# Patient Record
Sex: MALE | Race: BLACK OR AFRICAN AMERICAN | Employment: UNEMPLOYED | URBAN - METROPOLITAN AREA
[De-identification: names, ages, dates, MRNs, and addresses within clinical notes are randomized per-mention and may not be internally consistent; named-entity substitution may affect disease eponyms.]

---

## 2017-01-20 ENCOUNTER — TELEPHONE (OUTPATIENT)
Dept: PULMONOLOGY | Facility: CLINIC | Age: 57
End: 2017-01-20

## 2017-01-24 ENCOUNTER — DOCUMENTATION ONLY (OUTPATIENT)
Dept: FAMILY MEDICINE CLINIC | Age: 57
End: 2017-01-24

## 2017-01-24 NOTE — LETTER
1/24/2017 Dortha Goodpasture 
[address] [facility] [address] [id number] Dear Mr. Dortha Goodpasture, We had an appointment reserved for you 1/16/2017 and were concerned when you did not show or call within 24 hours to cancel the appointment. As indicated in a previous letter, we will no longer be able to schedule appointments for you in advance. If you need to make an appointment, please call the office and we will attempt to schedule, but cannot guarantee, a work-in appointment. In order to provide optimal care to you, we expect you to make it to your appointments. Regrettably, if you miss a work-in appointment, we may have to discharge you from the practice. Sincerely, The scheduling staff [address] [place] [id number] 
[phone]

## 2017-02-08 ENCOUNTER — OFFICE VISIT (OUTPATIENT)
Dept: PULMONOLOGY | Facility: CLINIC | Age: 57
End: 2017-02-08

## 2017-02-08 VITALS
DIASTOLIC BLOOD PRESSURE: 70 MMHG | TEMPERATURE: 98.6 F | WEIGHT: 152 LBS | HEIGHT: 67 IN | OXYGEN SATURATION: 98 % | BODY MASS INDEX: 23.86 KG/M2 | SYSTOLIC BLOOD PRESSURE: 110 MMHG | RESPIRATION RATE: 20 BRPM | HEART RATE: 66 BPM

## 2017-02-08 DIAGNOSIS — J43.2 CENTRILOBULAR EMPHYSEMA (HCC): ICD-10-CM

## 2017-02-08 DIAGNOSIS — J43.1 PANLOBULAR EMPHYSEMA (HCC): ICD-10-CM

## 2017-02-08 DIAGNOSIS — R06.09 DOE (DYSPNEA ON EXERTION): ICD-10-CM

## 2017-02-08 DIAGNOSIS — J41.0 SIMPLE CHRONIC BRONCHITIS (HCC): Primary | ICD-10-CM

## 2017-02-08 RX ORDER — BUPROPION HYDROCHLORIDE 300 MG/1
300 TABLET ORAL
COMMUNITY
End: 2018-07-20

## 2017-02-08 RX ORDER — QUETIAPINE 150 MG/1
TABLET, FILM COATED, EXTENDED RELEASE ORAL
COMMUNITY
End: 2018-07-20

## 2017-02-08 RX ORDER — HYDROXYZINE PAMOATE 25 MG/1
25 CAPSULE ORAL
COMMUNITY
End: 2018-07-20

## 2017-02-08 NOTE — MR AVS SNAPSHOT
Visit Information Date & Time Provider Department Dept. Phone Encounter #  
 2/8/2017  3:15 PM MD Mimi [facility] [id number] Follow-up Instructions Return in about 3 months (around 5/8/2017). Routing History Upcoming Health Maintenance Date Due FOBT Q 1 YEAR AGE 50-75 12/14/2017 DTaP/Tdap/Td series (2 - Td) 12/14/2026 Allergies as of 2/8/2017  Review Complete On: 2/8/2017 By: Lilian Carson MD  
  
 Severity Noted Reaction Type Reactions Matthias High 12/14/2016    Anaphylaxis Aspirin  12/14/2016    Other (comments), Unknown (comments) Interaction with anemia with long term use Sulfa (Sulfonamide Antibiotics)  01/27/2016    Hives Current Immunizations  Reviewed on 12/14/2016 Name Date Influenza Vaccine 1/27/2014, 2/19/2009 Influenza Vaccine (Quad) PF 12/14/2016 Pneumococcal Polysaccharide (PPSV-23) 1/27/2014 Tdap 12/14/2016 Not reviewed this visit You Were Diagnosed With   
  
 Codes Comments Simple chronic bronchitis ([address]    -  Primary ICD-10-CM: J41.0 ICD-9-CM: 491.0 Panlobular emphysema ([address]     ICD-10-CM: J43.1 ICD-9-CM: 492.8 Centrilobular emphysema ([address]     ICD-10-CM: J43.2 ICD-9-CM: 492.8 JAMES (dyspnea on exertion)     ICD-10-CM: R06.09 
ICD-9-CM: 786.09 Vitals BP Pulse Temp Resp Height(growth percentile) Weight(growth percentile) 110/70 (BP 1 Location: Left arm, BP Patient Position: At rest) 66 98.6 °F (37 °C) (Oral) 20 5' 7\" (1.702 m) 152 lb (68.9 kg) SpO2 BMI Smoking Status 98% 23.81 kg/m2 Current Every Day Smoker BMI and BSA Data Body Mass Index Body Surface Area  
 23.81 kg/m 2 1.8 m 2 Preferred Pharmacy Pharmacy Name Phone [facility] [address], [address] [place] [address] [phone] Your Updated Medication List  
  
   
 This list is accurate as of: 2/8/17  4:13 PM.  Always use your most recent med list.  
  
  
  
  
 albuterol 90 mcg/actuation inhaler Commonly known as:  PROVENTIL HFA, VENTOLIN HFA, PROAIR HFA  
1-2 Puffs. budesonide-formoterol 160-4.5 mcg/actuation HFA inhaler Commonly known as:  SYMBICORT Take 2 Puffs by inhalation two (2) times a day. BUSPAR PO Take  by mouth. carvedilol 6.25 mg tablet Commonly known as:  Maki Buster Take 1 Tab by mouth two (2) times daily (with meals). Indications: Chronic Heart Failure  
  
 clopidogrel 75 mg Tab Commonly known as:  PLAVIX Take 1 Tab by mouth daily. Indications: PERIPHERAL ARTERIAL THROMBOEMBOLISM PREVENTION  
  
 DEPAKOTE PO Take  by mouth.  
  
 gabapentin 300 mg capsule Commonly known as:  NEURONTIN Take 300 mg by mouth three (3) times daily. LORazepam 2 mg tablet Commonly known as:  ATIVAN Take 1 Tab by mouth two (2) times a day. Max Daily Amount: 4 mg. Indications: ALCOHOL WITHDRAWAL SYNDROME, INSOMNIA  
  
 omeprazole 40 mg capsule Commonly known as:  PRILOSEC Take 40 mg by mouth daily. rosuvastatin 40 mg tablet Commonly known as:  CRESTOR Take 1 Tab by mouth nightly. Indications: primary prevention of coronary heart disease SEROquel  mg sr tablet Generic drug:  QUEtiapine SR Take  by mouth nightly. VISTARIL 25 mg capsule Generic drug:  hydrOXYzine pamoate Take 25 mg by mouth three (3) times daily as needed for Itching. WELLBUTRIN  mg XL tablet Generic drug:  buPROPion XL Take 300 mg by mouth every morning. Follow-up Instructions Return in about 3 months (around 5/8/2017). To-Do List   
 02/08/2017 Imaging:  CT CHEST WO CONT Introducing [facility]! Juan Vasquez introduces [employer] patient portal. Now you can access parts of your medical record, email your doctor's office, and request medication refills online. 1. In your internet browser, go to [email or web address] 2. Click on the First Time User? Click Here link in the Sign In box. You will see the New Member Sign Up page. 3. Enter your [employer] Access Code exactly as it appears below. You will not need to use this code after youve completed the sign-up process. If you do not sign up before the expiration date, you must request a new code. · [employer] Access Code: [email or web address] Expires: 3/7/2017  1:45 PM 
 
4. Enter the last four digits of your Social Security Number (xxxx) and Date of Birth (mm/dd/yyyy) as indicated and click Submit. You will be taken to the next sign-up page. 5. Create a [employer] ID. This will be your [employer] login ID and cannot be changed, so think of one that is secure and easy to remember. 6. Create a [employer] password. You can change your password at any time. 7. Enter your Password Reset Question and Answer. This can be used at a later time if you forget your password. 8. Enter your e-mail address. You will receive e-mail notification when new information is available in [address] 9. Click Sign Up. You can now view and download portions of your medical record. 10. Click the Download Summary menu link to download a portable copy of your medical information. If you have questions, please visit the Frequently Asked Questions section of the [employer] website. Remember, [employer] is NOT to be used for urgent needs. For medical emergencies, dial 911. Now available from your iPhone and Android! Please provide this summary of care documentation to your next provider. Your primary care clinician is listed as Abby Miller. If you have any questions after today's visit, please call [phone].

## 2017-02-08 NOTE — PROGRESS NOTES
[facility]  Pulmonary, Critical Care, and Sleep Medicine  [address] [facility], [facility], [facility]  Ph: [phone]; Fax: [phone]    Name: Elsie Quintana MRN: [id number]   : 1960 Date: 2017        Pulmonary Follow Up                                              Consult requesting physician: Rachel Hernandez NP  Reason for Consult: COPD      History of Present Illness:  Mr. Markie Okeefe is a 64year old male with past medical history significant for GERD, hyperlipidemia, coronary artery disease with history of MI in , status post stent placement and CABG in , sickle cell trait, active smoker of half pack per day since 35 years, history of bilateral lower extremity DVT and left lower lobe PE on 14, status post six months of anticoagulation therapy, who was referred to me initially by primary care provider for multiple ER visits for dyspnea and bronchitis. He again was diagnosed with pneumonia and treated with Abx about 1 week ago. Patient denies any fever, chills, productive cough at the present time. He denies any chest pain, diaphoresis, palpitations, lightheadedness, dizziness, nausea, vomiting, abdominal pain, diarrhea, constipation, dysuria, increased frequency of urination, lower extremity edema, paroxysmal nocturnal dyspnea, headache, diplopia, seizure, focal weakness, generalized weakness, gait imbalance, frequent falls, excess urination, bone pain, joint pain, swelling or stiffness, weight loss, appetite loss, night sweats, hemoptysis, dysphonia, dysphagia, lumps or bumps on skin, lymph node enlargement. He is an active smoker and he continues to smoke. Smoking cessation counseling is done by me. He was never diagnosed with asthma or interstitial lung disease. Denies any dyspnea at rest. Can walk about 1 mile without JAMES since started on symbicort on previous visit. No associated wheezing or cough.  Wakes up at night without any pulmonary symptoms. Review of Systems:  All other systems negative. CTA chest 11/14/16 at [place] showed no PE. Mild emphysematous changes with bronchiectasis and peribronchial thickening with interval progression compared to 12/16/14, stable mediastinal lymphadenopathy with largest AP window unchanged lymph node of 1.3 x 1.9 cm, moderate bilateral hilar lymphadenopathy with interval progression compared to CT scan on 12/14/16, right hilar lymph node of 1.9 x 2.7 cm and left hilar lymph node of 2.2 x 3.7 cm, likely related to inflammatory process per report. Multiple noncalcified pleural plaques, greater on the right side, could be postinflammatory pleural process, but less likely asbestos related process. PFT 12/07/16 showed moderate obstructive defect with FEV1 of 2.38/83% predicted post bronchodilator, normal TLC, mildly reduced DLCO of 61%. Assessment:  64year old male with:  1. Moderate COPD with post bronchodilator FEV1 of 2.38 liters/83% predicted. 2. Mild emphysema seen on CT scan with DLCO of 61% predicted. 3. Active smoker/cigarette smoker. 4. Sickle cell trait. 5. History of bilateral lower extremity DVT and left lower lobe PE on 1/27/14, status post six months of anticoagulation treatment. 6. GERD. 7. Stable mild mediastinal lymphadenopathy with largest AP window unchanged lymph node of 1.3 x 1.9 cm, moderate bilateral hilar lymphadenopathy with interval progression, with right hilar largest lymph node of 1.9 x 2.7 cm and left hilar lymph node of 2.2 x 3.7 cm on CT scan 11/14/16 compared to 12/14/16.  8. Multiple noncalcified pleural plaques, right more than left, could be postinflammatory pleural process, but less likely asbestos related process with history of asbestos exposure while working as a . 9. Recurrent chronic bronchitis requiring ER visit, steroid and antibiotic use. Plan:  1.  Regarding patient's multiple chronic bronchitis exacerbation, patient has been treated with multiple steroid antibiotic course and bronchodilator use. Patient's PFT suggested moderate obstruction and also mildly reduced DLCO. Will c/w Symbicort 160/4.5, two puffs twice daily, which is started on previous visit. C/w Albuterol prn. Inhaler technique has been taught to the patient by respiratory therapist.  2. Regarding mediastinal hilar lymphadenopathy that was likely reactive with acute bronchitis symptoms, as were seen on the CTA with bronchiectasis and peribronchial thickening, malignancy is less likely. Sarcoidosis would be another possibility. Patient is educated about all the differential diagnosis. Follow up CT scan- ordered  3. Regarding noncalcified pleural plaques, which could be inflammatory per radiologist's report, will monitor on the repeat CT scan in the future. 4. Smoking cessation counseling is done by me and patient is strongly encouraged and educated to quit smoking. · Smoking cessation counseling done by me and spent >11 minutes on it. Strongly advised to quit smoking. · Pulmonary rehab. In future  · Yearly influenza vaccine and pneumococcal vaccine as appropriate discussed with patient. · Discussed distinction between quick-relief and controlled medications. · Discussed technique for using MDIs and/or nebulizer. · Plan of care discussed with patient. Discussed diagnosis, its evaluation, treatment and ususal course. Discussed medication dosage, use, side effects, and goals of treatment in detail. Warning signs of respiratory distress were reviewed with the patient. Discussed avoidance of precipitants. · HIM care and advance directive per PCP. · Prior/Old records reviewed and discussed with patient. · Labs, Images and available PFT and sleep study discussed with patient. Labs and images personally seen and available reports reviewed with patient. · All current medicines are reviewed and doses and prescription adjusted.   · Care of plan discussed with nursing. · Further recommendations will be based on the ptient's response to recommended treatment and results of the investigation ordered. · Discussed with patient and family - fiance, radiologic work up showed, answered all questions to their satisfaction. · Follow-up: 1 week after ct chest, sooner should new symptoms or problems arise. · Patient to follow up with pulmonary at [facility] office with Dr. Casandra Elias or Dr. Demetris Butcher          Review of Systems:  A comprehensive review of systems was negative. Review of Systems:   HEENT: No epistaxis, no nasal drainage, no difficulty in swallowing, no redness in eyes  Respiratory: as above  Cardiovascular: no chest pain, no palpitations, no chronic leg edema, no syncope  Gastrointestinal: no abd pain, no vomiting, no diarrhea, no bleeding symptoms  Genitourinary: No urinary symptoms or hematuria  Integument/breast: No ulcers or rashes  Musculoskeletal:Neg  Neurological: No focal weakness, no seizures, no headaches  Behvioral/Psych: No anxiety, no depression  Constitutional: No fever, no chills, no weight loss, no night sweats       Immunization status:   Immunization History   Administered Date(s) Administered    Influenza Vaccine 02/19/2009, 01/27/2014    Influenza Vaccine (Quad) PF 12/14/2016    Pneumococcal Polysaccharide (PPSV-23) 01/27/2014    Tdap 12/14/2016      Influenza vaccine: 2015, reported by patient  Pneumococcal vaccine: never, reported by patient    PPD: negative 2015 , reported by patient  TB personal history: no  TB exposure: no    Occupation: don't work. 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      Lives with: fiance    Allergies   Allergen Reactions    Matthias Anaphylaxis    Aspirin Other (comments) and Unknown (comments)     Interaction with anemia with long term use    Sulfa (Sulfonamide Antibiotics) Hives      Seasonal Allergy: no  Animal Allergy: cats    Past Medical History Diagnosis Date    Alcoholism with alcohol dependence ([facility] [address]     BPH (benign prostatic hypertrophy) with urinary retention     CAD (coronary artery disease)      2006 X 2    Chronic lung disease     Chronic obstructive pulmonary disease (HCC)     Congestive heart failure (HCC)     Coronary artery disease     Depression     GERD (gastroesophageal reflux disease)     Hypertension     Liver disease      Hepatitis C    Psychotic disorder     Thromboembolus [facility])         Past Surgical History   Procedure Laterality Date    Hx orthopaedic       cervical spine 2013    Pr cardiac surg procedure unlist       CABG X6 2006 X2    Pr bypass graft othr,fem-fem       2006        Family History   Problem Relation Age of Onset    Heart Disease Mother     Heart Disease Father     Hypertension Father     No Known Problems Sister     No Known Problems Sister     No Known Problems Sister     No Known Problems Sister     No Known Problems Sister       Hx of asthma/atopy/emphysema: couple sisters have asthma   Lung cancer: no  ILD/Sarcoidosis: no  VTE: no  Pulmonary fibrosis: no  PAH: no  Sickle Cell Disease/Trait: no  AT deficiency or trait: no  TB: no  JOURDAN: no    Social History   Substance Use Topics    Smoking status: Current Every Day Smoker     Packs/day: 0.50     Years: 35.00     Types: Cigarettes    Smokeless tobacco: Current User    Alcohol use No     Illicit Drugs: no  Pets: Dogs/Cats/Birds/Reptiles/Other animals: rabbit, guinea pig, 3 cats, 1 dog      Current Outpatient Prescriptions   Medication Sig Dispense Refill    carvedilol (COREG) 6.25 mg tablet Take 1 Tab by mouth two (2) times daily (with meals). Indications: Chronic Heart Failure 60 Tab 0    rosuvastatin (CRESTOR) 40 mg tablet Take 1 Tab by mouth nightly. Indications: primary prevention of coronary heart disease 30 Tab 0    clopidogrel (PLAVIX) 75 mg tab Take 1 Tab by mouth daily.  Indications: PERIPHERAL ARTERIAL THROMBOEMBOLISM PREVENTION 30 Tab 0    LORazepam (ATIVAN) 2 mg tablet Take 1 Tab by mouth two (2) times a day. Max Daily Amount: 4 mg. Indications: ALCOHOL WITHDRAWAL SYNDROME, INSOMNIA 60 Tab 0    albuterol (PROVENTIL HFA, VENTOLIN HFA, PROAIR HFA) 90 mcg/actuation inhaler 1-2 Puffs.  budesonide-formoterol (SYMBICORT) 160-4.5 mcg/actuation HFA inhaler Take 2 Puffs by inhalation two (2) times a day. 1 Inhaler 6    gabapentin (NEURONTIN) 300 mg capsule Take 300 mg by mouth three (3) times daily.  omeprazole (PRILOSEC) 40 mg capsule Take 40 mg by mouth daily. Objective:   Vital Signs:    Visit Vitals    /70 (BP 1 Location: Left arm, BP Patient Position: At rest)    Pulse 66    Temp 98.6 °F (37 °C) (Oral)    Resp 20    Ht 5' 7\" (1.702 m)    Wt 68.9 kg (152 lb)    SpO2 98%    BMI 23.81 kg/m2           Physical Exam:     General/Neurology: Alert, Awake, NAD. Head:   Normocephalic, without obvious abnormality, atraumatic. Eye:   no scleral icterus, no pallor, no cyanosis  Nose/Sinus:  No sinus tenderness, no erythema, no discharge. No Maxillary sinus tenderness. Throat:  Lips, mucosa, and tongue normal. Teeth: some black teeth and poor dentition. Gums normal. No tonsillar enlargement, no erythema, no exudates. No oral thrush. MP 2-3  Neck:   Supple, symmetric. Thyroid: no enlargement/tenderness/nodule. No lymphadenopathy. Trachea midline  Back & spine: Symmetric, no curvature. Chest wall: No tenderness or deformity. No rash  Lung: Moderate air entry bilateral equal. No stridors. No rales. No rhonchi. No wheezing. No prolonged expiration. No accessory muscle use. Heart:   Regular rate & rhythm. S1 S2 present. No murmur. No JVD. Abdomen/: Soft, NT, ND, +BS, no masses, no organomegaly  Extremities:  No pedal edema. No cyanosis. No clubbing  Pulses: 2+ and symmetric in DP  Lymphatic:  No cervical, supraclavicular palpable lymphadenopathy. Musculoskeletal: No joint swelling or tenderness.   Skin: Color, texture, turgor normal. No rashes or lesions        Data:       CBC w/Diff Lab Results   Component Value Date/Time    WBC 6.2 12/14/2016 10:46 AM    RBC 5.19 12/14/2016 10:46 AM    HGB 15.5 12/14/2016 10:46 AM    HCT 45.6 12/14/2016 10:46 AM    PLATELET 524 12/84/4671 10:46 AM    NEUTROPHILS 51 12/14/2016 10:46 AM    LYMPHOCYTES 36 12/14/2016 10:46 AM    EOSINOPHILS 5 12/14/2016 10:46 AM        Chemistry Lab Results   Component Value Date/Time    Glucose 85 12/14/2016 10:46 AM    Sodium 142 12/14/2016 10:46 AM    Potassium 4.6 12/14/2016 10:46 AM    Chloride 107 12/14/2016 10:46 AM    CO2 30 12/14/2016 10:46 AM    BUN 11 12/14/2016 10:46 AM    Creatinine 1.12 12/14/2016 10:46 AM    Calcium 8.7 12/14/2016 10:46 AM    Anion gap 5 12/14/2016 10:46 AM    BUN/Creatinine ratio 10 12/14/2016 10:46 AM    Alk. phosphatase 87 12/14/2016 10:46 AM    Protein, total 7.5 12/14/2016 10:46 AM    Albumin 3.8 12/14/2016 10:46 AM    Globulin 3.7 12/14/2016 10:46 AM    A-G Ratio 1.0 12/14/2016 10:46 AM        BNP No results found for: BNP, BNPP, XBNPT     Coagulation No results found for: PTP, INR, APTT      Thyroid  No results found for: T4, TSH, TSHEXT, TSHEXT       ABG No results found for: PHI, PHI, PCO2I, PO2, PO2I, HCO3, HCO3I, FIO2, FIO2I     Micro  Lab Results   Component Value Date/Time    Culture result: NO GROWTH 2 DAYS 02/19/2016 03:13 PM     Lab Results   Component Value Date/Time    Culture result: NO GROWTH 2 DAYS 02/19/2016 03:13 PM        Bronchoscopy       Pathology       ECHO        LE Doppler       PFT 12/7/16 PULMONARY FUNCTION TEST:    Spirometry/Flows:  FET (Forced Expiratory Time) pre-bronchodilator 7.25 seconds and post-bronchodilator 8.66 seconds. FEV1 (Forced Expiratory Volume in one second) is reduced (2.01 L or 70% predicted) before bronchodilator, improved after bronchodilator (2.38 L or 83% predicted), with 18% change.   FVC (Forced Vital Capacity) is reduced (2.7 L or 74% predicted) before bronchodilator, improved after bronchodilator (3.21 L or 88% predicted), with 19% change. FEV1/FVC ratio (FEV 1%) is normal 74% but FEV1/SVC reduced at 66%. Maximal Mid Expiratory Flow rate is reduced (1.46 L or 52% predicted). Bronchodilator:  Significant improvement with bronchodilator    Flow Volume Loop:  Nonspecific obstructive pattern in Flow Volume Loop    Volumes: All Volumes are normal except IC reduced at 75%  Total Lung Capacity is normal (4.97 L or 87% predicted). Vital Capacity is normal (3.03 L or 83% predicted). Diffusion:  Diffusion Capacity reduced (16.63 mL/min/mm Hg or 61 % predicted). IMPRESSION:  Spirometry indicates moderate obstruction. There is a significant improvement in  FEV1 and FVC,  following the inhalation of a bronchodilator. Total lung capacity is within normal limits. The diffusing capacity for carbon monoxide is  mildly reduced. CONCLUSION:  Moderate obstructive defect, Reduced diffusion capacity indicating a decrease in alveolar surface area for gas exchange    See technicians comments. Please see scanned PFT raw data in patient's chart. Jovita Koehler MD  12/7/2016     PET         CT (Most Recent) reviewed by me independently No results found for this or any previous visit. XR (Most Recent). CXR reviewed by me and compared with previous CXR No results found for this or any previous visit. CT chest 11/14/16:  IMPRESSION:    1.  No convincing CT evidence of pulmonary embolism. 2.  No acute pulmonary finding. Mild COPD is mildly progressed as above. 3. Stable mild mediastinal adenopathy and mildly progressed bilateral hilar adenopathy, probably indicative of mild inflammatory process.      4. Multiple noncalcified pleural plaques again noted, greater on the right. This could be postinflammatory pleural process and but less likely asbestos related process regarding the noncalcified and asymmetric pattern.     5. Stable tiny hepatic hypodensities, likely small cysts. Thank you for your referral.    Result Narrative   CTA of the chest with contrast     CPT code: 10837     History: Chest pain with progressive cough     Comparison: CT 12/16/14    Technique: Thin section axial scan through the chest is obtained from the thoracic inlet to the diaphragm after dynamic nonionic IV contrast administration per PE protocol.      Utilization of smart prep dynamic bolus enhancement, timing centered for pulmonary artery delineation as well as coronal and sagittal maximum intensity projection (MIP) computer reconstructions are also utilized to better define pulmonary artery anatomy and increase sensitivity for detecting emboli. Contrast: Nonionic IV contrast administered utilizing 100 cc of [id number].      All CT scans at this facility performed using dose optimization techniques as appreciate to a performed exam, to include automated exposure control, adjustment of the mA and/or KV according to patient size (including appropriate matching for site specific examinations), or use of iterative reconstruction technique. Mabeline Sink -----------  Findings:     --- VASCULATURE ---    Pulmonary arteries: There is adequate contrast bolus. The mainstem right and left pulmonary arteries and their  branches appear patent without convincing evidence of intraluminal filling defect identified to suggest pulmonary embolism. Aorta and the great vessels: Normal in caliber. --- CT CHEST ---    Lung parenchyma:   Mild emphysematous changes with bronchiectasis and peribronchial thickening are present with interval progression since the prior CT. No acute pulmonary infarction, consolidation or infiltration seen. No pulmonary nodule, mass or focal pulmonary finding.      Thyroid/Base Of The Neck: Unremarkable on imaged portion. Mediastinum: Mild mediastinal adenopathy in AP window appears unchanged and the largest measures 1.3 x 1.9 cm.  The moderate bilateral hilar adenopathy seems to be mildly interval progressed compared to the prior CT. The right hilar adenopathy measures 1.9 x 2.7 cm. The left hilar adenopathy measures 2.2 x 3.7 cm.       Heart and pericardium: Unremarkable. Pleura and chest wall:   Multiple flat and noncalcified pleural plaques are identified, greater on the right, grossly stable. No pleural effusion.      Imaged upper abdomen: Small hypodense nodules in the right lobe of the liver appear stable and suggesting benign cysts. OSSEOUS STRUCTURES: Median sternotomy again noted. Unremarkable bony structures otherwise. Imaging:   [] I have personally reviewed the patients radiographs  [] Radiographs reviewed with radiologist  [x] No CXR study available for review today  [] No change from prior  [] Improved   [] Worsening      [x]See my orders for details    Please note: This document has been produced using voice recognition software. Unrecognized errors in transcription may be present.     Rich Sparks MD  2/8/2017

## 2017-02-18 ENCOUNTER — HOSPITAL ENCOUNTER (OUTPATIENT)
Dept: CT IMAGING | Age: 57
Discharge: HOME | End: 2017-02-18
Attending: INTERNAL MEDICINE
Payer: MEDICARE

## 2017-02-18 DIAGNOSIS — J43.1 PANLOBULAR EMPHYSEMA (HCC): ICD-10-CM

## 2017-02-18 DIAGNOSIS — R06.09 DOE (DYSPNEA ON EXERTION): ICD-10-CM

## 2017-02-18 DIAGNOSIS — J43.2 CENTRILOBULAR EMPHYSEMA (HCC): ICD-10-CM

## 2017-02-18 DIAGNOSIS — J41.0 SIMPLE CHRONIC BRONCHITIS (HCC): ICD-10-CM

## 2017-02-18 PROCEDURE — 71250 CT THORAX DX C-: CPT

## 2017-02-20 ENCOUNTER — DOCUMENTATION ONLY (OUTPATIENT)
Dept: PULMONOLOGY | Facility: CLINIC | Age: 57
End: 2017-02-20

## 2017-02-20 NOTE — PROGRESS NOTES
Results from [facility] encounter on 02/18/17   CT CHEST WO CONT   Narrative EXAM: CT Chest     INDICATION: Emphysema, lymphadenopathy    COMPARISON: None available    TECHNIQUE: Axial CT imaging from the thoracic inlet through the diaphragm  without intravenous contrast. Multiplanar reformats were generated. Dose reduction techniques used: Automated exposure control, adjustment of the  mAs and/or kVp according to patient's size, and iterative reconstruction  techniques. The specific techniques utilized on this CT exam have been  documented in the patient's electronic medical record.      _______________    FINDINGS:    LUNGS: No suspicious nodule or mass. No abnormal opacities. There is linear  atelectasis in the left lung base. PLEURA: There are multiple calcified and noncalcified pleural plaques    AIRWAY: Normal.    MEDIASTINUM: Normal heart size. No pericardial effusion. Great vessels  unremarkable. LYMPH NODES: No enlarged lymph nodes. UPPER ABDOMEN: Hypodensity within the right hepatic lobe, likely a cyst..    OTHER: No acute or aggressive osseous abnormalities identified. _______________         Impression IMPRESSION:      1. No acute abnormalities      2. No radiographic changes to suggest emphysema or lymphadenopathy        A/p:  - see my previous note for detail  - no mediastinal or hilar LN enlargement which were previously seen and so it could be reactive. - pleural calcified and noncalcified plaques again seen, of unknown significance. Consider follow up as patient doesn't have significant asbestos exposure history. D/w patient on phone and updated. F/u with pulmonary as planned.      Patient to follow up with pulmonary at [place] office with Dr. Destiny Dao or Dr. Huma Sargent MD 2/20/2017

## 2017-04-25 ENCOUNTER — TELEPHONE (OUTPATIENT)
Dept: CARDIOLOGY CLINIC | Age: 57
End: 2017-04-25

## 2018-07-20 ENCOUNTER — OFFICE VISIT (OUTPATIENT)
Dept: FAMILY MEDICINE CLINIC | Age: 58
End: 2018-07-20

## 2018-07-20 ENCOUNTER — HOSPITAL ENCOUNTER (OUTPATIENT)
Dept: LAB | Age: 58
Discharge: HOME | End: 2018-07-20
Payer: MEDICARE

## 2018-07-20 VITALS
DIASTOLIC BLOOD PRESSURE: 95 MMHG | RESPIRATION RATE: 20 BRPM | OXYGEN SATURATION: 97 % | BODY MASS INDEX: 22.7 KG/M2 | HEIGHT: 67 IN | HEART RATE: 68 BPM | WEIGHT: 144.6 LBS | SYSTOLIC BLOOD PRESSURE: 139 MMHG | TEMPERATURE: 98.2 F

## 2018-07-20 DIAGNOSIS — B19.20 HEPATITIS C VIRUS INFECTION WITHOUT HEPATIC COMA, UNSPECIFIED CHRONICITY: ICD-10-CM

## 2018-07-20 DIAGNOSIS — F17.210 CIGARETTE NICOTINE DEPENDENCE WITHOUT COMPLICATION: ICD-10-CM

## 2018-07-20 DIAGNOSIS — Z91.199 POOR COMPLIANCE: ICD-10-CM

## 2018-07-20 DIAGNOSIS — I73.9 PAD (PERIPHERAL ARTERY DISEASE) (HCC): Primary | ICD-10-CM

## 2018-07-20 DIAGNOSIS — E78.49 OTHER HYPERLIPIDEMIA: ICD-10-CM

## 2018-07-20 DIAGNOSIS — I10 ESSENTIAL HYPERTENSION: ICD-10-CM

## 2018-07-20 DIAGNOSIS — F33.9 RECURRENT DEPRESSION (HCC): ICD-10-CM

## 2018-07-20 DIAGNOSIS — I25.810 CORONARY ARTERY DISEASE INVOLVING CORONARY BYPASS GRAFT OF NATIVE HEART WITHOUT ANGINA PECTORIS: ICD-10-CM

## 2018-07-20 DIAGNOSIS — Z95.1 S/P CABG X 6: ICD-10-CM

## 2018-07-20 DIAGNOSIS — Z86.718 HISTORY OF DVT (DEEP VEIN THROMBOSIS): ICD-10-CM

## 2018-07-20 DIAGNOSIS — I25.810 CORONARY ARTERY DISEASE INVOLVING AUTOLOGOUS ARTERY CORONARY BYPASS GRAFT WITHOUT ANGINA PECTORIS: ICD-10-CM

## 2018-07-20 DIAGNOSIS — D55.0 GLUCOSE-6-PHOSPHATE DEHYDROGENASE (G6PD) DEFICIENCY ANEMIA (HCC): ICD-10-CM

## 2018-07-20 DIAGNOSIS — J44.9 CHRONIC OBSTRUCTIVE PULMONARY DISEASE, UNSPECIFIED COPD TYPE (HCC): ICD-10-CM

## 2018-07-20 DIAGNOSIS — H57.89 MASS OF EYE, RIGHT: ICD-10-CM

## 2018-07-20 DIAGNOSIS — D57.3 SICKLE CELL TRAIT (HCC): ICD-10-CM

## 2018-07-20 DIAGNOSIS — I73.9 PAD (PERIPHERAL ARTERY DISEASE) (HCC): ICD-10-CM

## 2018-07-20 DIAGNOSIS — F31.9 BIPOLAR AFFECTIVE DISORDER, REMISSION STATUS UNSPECIFIED (HCC): ICD-10-CM

## 2018-07-20 LAB
ALBUMIN SERPL-MCNC: 4.1 G/DL (ref 3.4–5)
ALBUMIN/GLOB SERPL: 1 {RATIO} (ref 0.8–1.7)
ALP SERPL-CCNC: 93 U/L (ref 45–117)
ALT SERPL-CCNC: 36 U/L (ref 16–61)
ANION GAP SERPL CALC-SCNC: 8 MMOL/L (ref 3–18)
APPEARANCE UR: CLEAR
AST SERPL-CCNC: 34 U/L (ref 15–37)
BACTERIA URNS QL MICRO: NEGATIVE /HPF
BASOPHILS # BLD: 0 K/UL (ref 0–0.1)
BASOPHILS NFR BLD: 0 % (ref 0–2)
BILIRUB SERPL-MCNC: 0.3 MG/DL (ref 0.2–1)
BILIRUB UR QL: NEGATIVE
BUN SERPL-MCNC: 12 MG/DL (ref 7–18)
BUN/CREAT SERPL: 11 (ref 12–20)
CALCIUM SERPL-MCNC: 8.9 MG/DL (ref 8.5–10.1)
CHLORIDE SERPL-SCNC: 108 MMOL/L (ref 100–108)
CHOLEST SERPL-MCNC: 261 MG/DL
CO2 SERPL-SCNC: 25 MMOL/L (ref 21–32)
COLOR UR: YELLOW
CREAT SERPL-MCNC: 1.12 MG/DL (ref 0.6–1.3)
DIFFERENTIAL METHOD BLD: NORMAL
EOSINOPHIL # BLD: 0 K/UL (ref 0–0.4)
EOSINOPHIL NFR BLD: 0 % (ref 0–5)
EPITH CASTS URNS QL MICRO: NORMAL /LPF (ref 0–5)
ERYTHROCYTE [DISTWIDTH] IN BLOOD BY AUTOMATED COUNT: 13.7 % (ref 11.6–14.5)
GLOBULIN SER CALC-MCNC: 4 G/DL (ref 2–4)
GLUCOSE SERPL-MCNC: 94 MG/DL (ref 74–99)
GLUCOSE UR STRIP.AUTO-MCNC: NEGATIVE MG/DL
HCT VFR BLD AUTO: 46.4 % (ref 36–48)
HDLC SERPL-MCNC: 60 MG/DL (ref 40–60)
HDLC SERPL: 4.4 {RATIO} (ref 0–5)
HGB BLD-MCNC: 15.9 G/DL (ref 13–16)
HGB UR QL STRIP: NEGATIVE
INR PPP: 1 (ref 0.8–1.2)
KETONES UR QL STRIP.AUTO: ABNORMAL MG/DL
LDLC SERPL CALC-MCNC: 177 MG/DL (ref 0–100)
LEUKOCYTE ESTERASE UR QL STRIP.AUTO: NEGATIVE
LIPID PROFILE,FLP: ABNORMAL
LYMPHOCYTES # BLD: 2.8 K/UL (ref 0.9–3.6)
LYMPHOCYTES NFR BLD: 28 % (ref 21–52)
MCH RBC QN AUTO: 30.2 PG (ref 24–34)
MCHC RBC AUTO-ENTMCNC: 34.3 G/DL (ref 31–37)
MCV RBC AUTO: 88.2 FL (ref 74–97)
MONOCYTES # BLD: 0.7 K/UL (ref 0.05–1.2)
MONOCYTES NFR BLD: 7 % (ref 3–10)
NEUTS SEG # BLD: 6.4 K/UL (ref 1.8–8)
NEUTS SEG NFR BLD: 65 % (ref 40–73)
NITRITE UR QL STRIP.AUTO: NEGATIVE
PH UR STRIP: 5 [PH] (ref 5–8)
PLATELET # BLD AUTO: 213 K/UL (ref 135–420)
PMV BLD AUTO: 9.6 FL (ref 9.2–11.8)
POTASSIUM SERPL-SCNC: 4.2 MMOL/L (ref 3.5–5.5)
PROT SERPL-MCNC: 8.1 G/DL (ref 6.4–8.2)
PROT UR STRIP-MCNC: 100 MG/DL
PROTHROMBIN TIME: 12.5 SEC (ref 11.5–15.2)
RBC # BLD AUTO: 5.26 M/UL (ref 4.7–5.5)
RBC #/AREA URNS HPF: NORMAL /HPF (ref 0–5)
SODIUM SERPL-SCNC: 141 MMOL/L (ref 136–145)
SP GR UR REFRACTOMETRY: 1.02 (ref 1–1.03)
TRIGL SERPL-MCNC: 120 MG/DL (ref ?–150)
TSH SERPL DL<=0.05 MIU/L-ACNC: 0.46 UIU/ML (ref 0.36–3.74)
UROBILINOGEN UR QL STRIP.AUTO: 0.2 EU/DL (ref 0.2–1)
VLDLC SERPL CALC-MCNC: 24 MG/DL
WBC # BLD AUTO: 9.9 K/UL (ref 4.6–13.2)
WBC URNS QL MICRO: NORMAL /HPF (ref 0–4)

## 2018-07-20 PROCEDURE — 36415 COLL VENOUS BLD VENIPUNCTURE: CPT | Performed by: INTERNAL MEDICINE

## 2018-07-20 PROCEDURE — 80061 LIPID PANEL: CPT | Performed by: INTERNAL MEDICINE

## 2018-07-20 PROCEDURE — 80053 COMPREHEN METABOLIC PANEL: CPT | Performed by: INTERNAL MEDICINE

## 2018-07-20 PROCEDURE — 81001 URINALYSIS AUTO W/SCOPE: CPT | Performed by: INTERNAL MEDICINE

## 2018-07-20 PROCEDURE — 87521 HEPATITIS C PROBE&RVRS TRNSC: CPT | Performed by: INTERNAL MEDICINE

## 2018-07-20 PROCEDURE — 87389 HIV-1 AG W/HIV-1&-2 AB AG IA: CPT | Performed by: INTERNAL MEDICINE

## 2018-07-20 PROCEDURE — 85610 PROTHROMBIN TIME: CPT | Performed by: INTERNAL MEDICINE

## 2018-07-20 PROCEDURE — 85025 COMPLETE CBC W/AUTO DIFF WBC: CPT | Performed by: INTERNAL MEDICINE

## 2018-07-20 PROCEDURE — 84443 ASSAY THYROID STIM HORMONE: CPT | Performed by: INTERNAL MEDICINE

## 2018-07-20 RX ORDER — CLOPIDOGREL BISULFATE 75 MG/1
75 TABLET ORAL DAILY
Qty: 90 TAB | Refills: 1 | Status: SHIPPED | OUTPATIENT
Start: 2018-07-20 | End: 2019-04-23 | Stop reason: SDUPTHER

## 2018-07-20 RX ORDER — CARVEDILOL 6.25 MG/1
6.25 TABLET ORAL 2 TIMES DAILY WITH MEALS
Qty: 180 TAB | Refills: 1 | Status: SHIPPED | OUTPATIENT
Start: 2018-07-20 | End: 2019-04-23 | Stop reason: SDUPTHER

## 2018-07-20 RX ORDER — ROSUVASTATIN CALCIUM 40 MG/1
40 TABLET, COATED ORAL
Qty: 90 TAB | Refills: 1 | Status: SHIPPED | OUTPATIENT
Start: 2018-07-20 | End: 2019-04-23 | Stop reason: SDUPTHER

## 2018-07-20 RX ORDER — QUETIAPINE FUMARATE 100 MG/1
100 TABLET, FILM COATED ORAL
Qty: 90 TAB | Refills: 1 | Status: SHIPPED | OUTPATIENT
Start: 2018-07-20 | End: 2021-04-01 | Stop reason: SDUPTHER

## 2018-07-20 NOTE — PROGRESS NOTES
Room #      SUBJECTIVE:    Luis Munson is a 62 y.o. male who presents today for routine care and c/o of stye one eye    1. Have you been to the ER, urgent care clinic since your last visit? Hospitalized since your last visit? Yes    2. Have you seen or consulted any other health care providers outside of the [address] [place] [address] since your last visit? Include any pap smears or colon screening.  Yes  When :  Reason:    Health Maintenance reviewed Yes    Health Maintenance Due   Topic Date Due    FOBT Q 1 YEAR AGE 50-75  12/14/2017    MEDICARE YEARLY EXAM  03/14/2018

## 2018-07-20 NOTE — MR AVS SNAPSHOT
[address] [place] [address] [address] [place] [id number] 
[phone] Patient: Cecily Small. MRN: [id number] CDZ:[id number]/[id number] Visit Information Date & Time Provider Department Dept. Phone Encounter #  
 7/20/2018  8:45 AM Jocelyn Burgess, [facility] [phone] [id number] Follow-up Instructions Return in about 4 weeks (around 8/17/2018) for rov. Upcoming Health Maintenance Date Due FOBT Q 1 YEAR AGE 50-75 12/14/2017 MEDICARE YEARLY EXAM 3/14/2018 Influenza Age 5 to Adult 8/1/2018 DTaP/Tdap/Td series (2 - Td) 12/14/2026 Allergies as of 7/20/2018  Review Complete On: 7/20/2018 By: Jocelyn Burgess MD  
  
 Severity Noted Reaction Type Reactions [place] High 12/14/2016    Anaphylaxis Aspirin  12/14/2016    Other (comments), Unknown (comments) Interaction with anemia with long term use Sulfa (Sulfonamide Antibiotics)  01/27/2016    Hives Current Immunizations  Reviewed on 12/14/2016 Name Date Influenza Vaccine 1/27/2014, 2/19/2009 Influenza Vaccine (Quad) PF 12/14/2016 Pneumococcal Polysaccharide (PPSV-23) 1/27/2014 Tdap 12/14/2016 Not reviewed this visit You Were Diagnosed With   
  
 Codes Comments PAD (peripheral artery disease) (HCC)    -  Primary ICD-10-CM: I73.9 ICD-9-CM: 443.9 Coronary artery disease involving autologous artery coronary bypass graft without angina pectoris     ICD-10-CM: I25.810 ICD-9-CM: 414.04 Other hyperlipidemia     ICD-10-CM: E78.4 ICD-9-CM: 272.4 Essential hypertension     ICD-10-CM: I10 
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)     ICD-10-CM: D55.0 ICD-9-CM: 282.2 Sickle cell trait ([facility]     ICD-10-CM: D57.3 ICD-9-CM: 282.5 Hepatitis C virus infection without hepatic coma, unspecified chronicity     ICD-10-CM: B19.20 ICD-9-CM: 070.70 Poor compliance     ICD-10-CM: Z91.19 ICD-9-CM: V15.81 Mass of eye, right     ICD-10-CM: H57.8 ICD-9-CM: 379.92 S/P CABG x 6     ICD-10-CM: Z95.1 ICD-9-CM: V45.81 History of DVT (deep vein thrombosis)     ICD-10-CM: M34.330 ICD-9-CM: V12.51 Coronary artery disease involving coronary bypass graft of native heart without angina pectoris     ICD-10-CM: I25.810 ICD-9-CM: 414.05 Chronic obstructive pulmonary disease, unspecified COPD type ([facility]     ICD-10-CM: J44.9 ICD-9-CM: 512 Cigarette nicotine dependence without complication     MTD-44-QE: F17.210 ICD-9-CM: 305.1 Bipolar affective disorder, remission status unspecified ([facility]     ICD-10-CM: F31.9 ICD-9-CM: 296.80 Recurrent depression ([facility]     ICD-10-CM: F33.9 ICD-9-CM: 296.30 Vitals BP Pulse Temp Resp Height(growth percentile) Weight(growth percentile) (!) 139/95 (BP 1 Location: Left arm, BP Patient Position: Sitting) 68 98.2 °F (36.8 °C) (Oral) 20 5' 7\" (1.702 m) 144 lb 9.6 oz (65.6 kg) SpO2 BMI Smoking Status 97% 22.65 kg/m2 Current Every Day Smoker Vitals History BMI and BSA Data Body Mass Index Body Surface Area  
 22.65 kg/m 2 1.76 m 2 Preferred Pharmacy Pharmacy Name Phone [address], [address] [place] [address] [phone] Your Updated Medication List  
  
   
This list is accurate as of 7/20/18  9:20 AM.  Always use your most recent med list.  
  
  
  
  
 albuterol 90 mcg/actuation inhaler Commonly known as:  PROVENTIL HFA, VENTOLIN HFA, PROAIR HFA  
1-2 Puffs. budesonide-formoterol 160-4.5 mcg/actuation Hfaa Commonly known as:  SYMBICORT Take 2 Puffs by inhalation two (2) times a day. carvedilol 6.25 mg tablet Commonly known as:  Darrick Peat Take 1 Tab by mouth two (2) times daily (with meals). Indications: chronic heart failure  
  
 clopidogrel 75 mg Tab Commonly known as:  PLAVIX Take 1 Tab by mouth daily. Indications: PERIPHERAL ARTERIAL THROMBOEMBOLISM PREVENTION  
  
 QUEtiapine 100 mg tablet Commonly known as:  SEROquel Take 1 Tab by mouth nightly. rosuvastatin 40 mg tablet Commonly known as:  CRESTOR Take 1 Tab by mouth nightly. Indications: primary prevention of coronary heart disease Prescriptions Sent to Pharmacy Refills  
 carvedilol (COREG) 6.25 mg tablet 1 Sig: Take 1 Tab by mouth two (2) times daily (with meals). Indications: chronic heart failure Class: Normal  
 Pharmacy: [facility] [address] LINARES [address], [address] Ph #: [phone] Route: Oral  
 rosuvastatin (CRESTOR) 40 mg tablet 1 Sig: Take 1 Tab by mouth nightly. Indications: primary prevention of coronary heart disease Class: Normal  
 Pharmacy: [facility] [address]ES [address], [address] Ph #: [phone] Route: Oral  
 clopidogrel (PLAVIX) 75 mg tab 1 Sig: Take 1 Tab by mouth daily. Indications: PERIPHERAL ARTERIAL THROMBOEMBOLISM PREVENTION Class: Normal  
 Pharmacy: [facility] [address]ES [address], [address] Ph #: [phone] Route: Oral  
 QUEtiapine (SEROQUEL) 100 mg tablet 1 Sig: Take 1 Tab by mouth nightly. Class: Normal  
 Pharmacy: [facility] [address]ES [address], [address] Ph #: [phone] Route: Oral  
  
We Performed the Following REFERRAL TO CARDIOLOGY [TDS03 Custom] Comments:  
 57/M with CAD, s/p MI, s/p stents and CABG in 2006, PAD. REFERRAL TO GASTROENTEROLOGY [UNS12 Custom] Comments:  
 57/M with chronic hepatitis C.  
 REFERRAL TO OPHTHALMOLOGY [REF57 Custom] Comments:  
 57/M with a small mass on the right lower eyelid. REFERRAL TO PULMONARY DISEASE [NVN22 Custom] Comments:  
 57/M with COPD Follow-up Instructions Return in about 4 weeks (around 8/17/2018) for rov. To-Do List   
 07/20/2018   Lab:  CBC WITH AUTOMATED DIFF   
 07/20/2018 Lab:  HCV RNA BY MARIA ESTHER QL,RFLX TO QT   
  
 07/20/2018 Lab:  HIV 1/2 AG/AB, 4TH GENERATION,W RFLX CONFIRM   
  
 07/20/2018 Lab:  LIPID PANEL   
  
 07/20/2018 Lab:  METABOLIC PANEL, COMPREHENSIVE   
  
 07/20/2018 Lab:  PROTHROMBIN TIME + INR   
  
 07/20/2018 Lab:  TSH 3RD GENERATION Around 07/20/2018 Lab:  URINALYSIS W/ RFLX MICROSCOPIC Referral Information Referral ID Referred By Referred To  
  
 [id number] Debbi Klinefelter Not Available Visits Status Start Date End Date 1 New Request 7/20/18 7/20/19 If your referral has a status of pending review or denied, additional information will be sent to support the outcome of this decision. Referral ID Referred By Referred To  
 [id number] Daniel Almaguer MD  
   [address] [place] [address] [address] [facility] Phone: [phone] Fax: [phone] Visits Status Start Date End Date 1 Authorized 7/20/18 7/20/19 If your referral has a status of pending review or denied, additional information will be sent to support the outcome of this decision. Referral ID Referred By Referred To  
 [id number] Debbi Klinefelter Not Available Visits Status Start Date End Date 1 New Request 7/20/18 7/20/19 If your referral has a status of pending review or denied, additional information will be sent to support the outcome of this decision. Referral ID Referred By Referred To  
 [id number] Debbi Klinefelter Not Available Visits Status Start Date End Date 1 New Request 7/20/18 7/20/19 If your referral has a status of pending review or denied, additional information will be sent to support the outcome of this decision. Introducing [facility]! [employer] introduces [employer] patient portal. Now you can access parts of your medical record, email your doctor's office, and request medication refills online. 1. In your internet browser, go to [email or web address]. "[employer]Locatelyt 2. Click on the First Time User? Click Here link in the Sign In box. You will see the New Member Sign Up page. 3. Enter your OmniPV Access Code exactly as it appears below. You will not need to use this code after youve completed the sign-up process. If you do not sign up before the expiration date, you must request a new code. · OmniPV Access Code: [email or web address] Expires: 10/18/2018  8:35 AM 
 
4. Enter the last four digits of your Social Security Number (xxxx) and Date of Birth (mm/dd/yyyy) as indicated and click Submit. You will be taken to the next sign-up page. 5. Create a [employer]ist ID. This will be your OmniPV login ID and cannot be changed, so think of one that is secure and easy to remember. 6. Create a OmniPV password. You can change your password at any time. 7. Enter your Password Reset Question and Answer. This can be used at a later time if you forget your password. 8. Enter your e-mail address. You will receive e-mail notification when new information is available in [address] 9. Click Sign Up. You can now view and download portions of your medical record. 10. Click the Download Summary menu link to download a portable copy of your medical information. If you have questions, please visit the Frequently Asked Questions section of the OmniPV website. Remember, OmniPV is NOT to be used for urgent needs. For medical emergencies, dial 911. Now available from your iPhone and Android! Please provide this summary of care documentation to your next provider. Your primary care clinician is listed as Laron List. If you have any questions after today's visit, please call [phone].

## 2018-07-20 NOTE — PROGRESS NOTES
History of Present Illness  Nigel Vora is a 62 y.o. male who presents today for management of    Chief Complaint   Patient presents with    Medication Refill    St    Establish Care    Hypertension     Patient is here to establish care. He used to be NP Yari's patient. Last seen at [facility] 1.5 years ago. History of pulmonary embolism and bilateral DVT in 2014    Patient complains of small lump on the right lower eyelid for 6 months. Size has gradually increased in size. Associated symptoms: pruritus. No associated vision changes, pain, discharge. COPD Review:  The patient has history of COPD. Oxygen: He currently is not on home oxygen therapy. Symptoms: becomes dyspneic after 2 blocks. He has occasional cough, no increased mucus production, occasional wheezing. Patient uses 2 pillows at night. Patient does smoke cigarettes. He has not use Symbicort in over a year. He uses albuterol 2-3 times per week. Cardiovascular Review:  The patient has hyperlipidemia, coronary artery disease, history of prior MI in 2006, status post CABG  and status post coronary artery stenting, peripheral arterial disease. Diet and Lifestyle: not attempting to follow a low fat, low cholesterol diet, exercises sporadically, smoker half pack per day, alcohol intake 3 cans of beer per day  Home BP Monitoring: is not measured at home. Pertinent ROS: not taking medications regularly as instructed, no medication side effects noted, no TIA's, no chest pain on exertion, no dyspnea on exertion, no swelling of ankles. Patient has intermittent leg claudification. Hepatitis C   Patient has history of Hepatitis C. Patient tested positive several years ago. Test was performed as part of an evaluation of routine HCV screening. Patient's current symptoms include none. Patient denies abdominal pain, fatigue, fever, jaundice, malaise and weight loss. He was lost to follow-up.      Bipolar disorder, anxiety, depression  Patient denies any current symptoms, except for difficulty sleeping and mood swings. Past Medical History  Past Medical History:   Diagnosis Date    Alcoholism with alcohol dependence ([facility]     BPH (benign prostatic hypertrophy) with urinary retention     CAD (coronary artery disease)     2006 X 2    Chronic lung disease     Chronic obstructive pulmonary disease (HCC)     Congestive heart failure (HCC)     Coronary artery disease     Depression     GERD (gastroesophageal reflux disease)     Hypertension     Liver disease     Hepatitis C    Psychotic disorder     Thromboembolus [facility])         Surgical History  Past Surgical History:   Procedure Laterality Date    BYPASS GRAFT OTHR,FEM-FEM      2006    HX CORONARY ARTERY BYPASS GRAFT  2006    x6    HX CORONARY STENT PLACEMENT  2006    HX ORTHOPAEDIC      cervical spine 2013        Current Medications  Current Outpatient Prescriptions   Medication Sig    carvedilol (COREG) 6.25 mg tablet Take 1 Tab by mouth two (2) times daily (with meals). Indications: chronic heart failure    rosuvastatin (CRESTOR) 40 mg tablet Take 1 Tab by mouth nightly. Indications: primary prevention of coronary heart disease    clopidogrel (PLAVIX) 75 mg tab Take 1 Tab by mouth daily. Indications: PERIPHERAL ARTERIAL THROMBOEMBOLISM PREVENTION    QUEtiapine (SEROQUEL) 100 mg tablet Take 1 Tab by mouth nightly.  albuterol (PROVENTIL HFA, VENTOLIN HFA, PROAIR HFA) 90 mcg/actuation inhaler 1-2 Puffs.  budesonide-formoterol (SYMBICORT) 160-4.5 mcg/actuation HFA inhaler Take 2 Puffs by inhalation two (2) times a day. No current facility-administered medications for this visit.         Allergies/Drug Reactions  Allergies   Allergen Reactions    [place] Anaphylaxis    Aspirin Other (comments) and Unknown (comments)     Interaction with anemia with long term use    Sulfa (Sulfonamide Antibiotics) Hives        Family History  Family History   Problem Relation Age of Onset    Heart Disease Mother     Heart Disease Father     Hypertension Father     No Known Problems Sister     No Known Problems Sister     No Known Problems Sister     No Known Problems Sister     No Known Problems Sister         Social History  Social History     Social History    Marital status: LEGALLY      Spouse name: N/A    Number of children: N/A    Years of education: N/A     Occupational History    Not on file.      Social History Main Topics    Smoking status: Current Every Day Smoker     Packs/day: 0.50     Years: 35.00     Types: Cigarettes    Smokeless tobacco: Current User    Alcohol use 12.6 oz/week     0 Standard drinks or equivalent, 21 Cans of beer per week    Drug use: No      Comment: used cocaine in the past    Sexual activity: Yes     Partners: Female     Other Topics Concern    Not on file     Social History Narrative       Health Maintenance   Topic Date Due    FOBT Q 1 YEAR AGE 50-75  12/14/2017    MEDICARE YEARLY EXAM  03/14/2018    Influenza Age 9 to Adult  08/01/2018    DTaP/Tdap/Td series (2 - Td) 12/14/2026    Hepatitis C Screening  Completed    Pneumococcal 19-64 Medium Risk  Addressed     Immunization History   Administered Date(s) Administered    Influenza Vaccine 02/19/2009, 01/27/2014    Influenza Vaccine (Quad) PF 12/14/2016    Pneumococcal Polysaccharide (PPSV-23) 01/27/2014    Tdap 12/14/2016       Review of Systems  General ROS: negative for - chills, fever, night sweats, weight gain or weight loss  Psychological ROS: negative for - anxiety or depression  Ophthalmic ROS: negative  ENT ROS: negative  Respiratory ROS: positive for - cough  negative for - sputum changes, tachypnea or wheezing  Cardiovascular ROS: positive for - dyspnea on exertion  negative for - chest pain, edema, irregular heartbeat or palpitations  Gastrointestinal ROS: no abdominal pain, change in bowel habits, or black or bloody stools  Genito-Urinary ROS: no dysuria, trouble voiding, or hematuria  Musculoskeletal ROS: negative  Neurological ROS: negative      Physical Exam  Vital signs:   Vitals:    07/20/18 0842 07/20/18 0845   BP: (!) 145/95 (!) 139/95   Pulse: 75 68   Resp: 20    Temp: 98.2 °F (36.8 °C)    TempSrc: Oral    SpO2: 97%    Weight: 144 lb 9.6 oz (65.6 kg)    Height: 5' 7\" (1.702 m)        General: alert, oriented, not in distress  Head: scalp normal, atraumatic  Eyes: pupils are equal and reactive, full and intact EOM's  Neck: supple, no JVD, no lymphadenopathy, non-palpable thyroid  Chest/Lungs: clear breath sounds, no wheezing or crackles  Heart: normal rate, regular rhythm, no murmur  Abdomen: soft, non-distended, non-tender, normal bowel sounds, no organomegaly, no masses  Extremities: no focal deformities, no edema  Skin: no active skin lesions    Laboratory/Tests:  Labs ordered    Assessment/Plan:    1. PAD (peripheral artery disease) ([facility]  - continue Plavix  - LIPID PANEL; Future    2. Coronary artery disease involving coronary bypass graft of native heart without angina pectoris  - stable  - LIPID PANEL; Future  - carvedilol (COREG) 6.25 mg tablet; Take 1 Tab by mouth two (2) times daily (with meals). Indications: chronic heart failure  Dispense: 180 Tab; Refill: 1  - rosuvastatin (CRESTOR) 40 mg tablet; Take 1 Tab by mouth nightly. Indications: primary prevention of coronary heart disease  Dispense: 90 Tab; Refill: 1  - clopidogrel (PLAVIX) 75 mg tab; Take 1 Tab by mouth daily. Indications: PERIPHERAL ARTERIAL THROMBOEMBOLISM PREVENTION  Dispense: 90 Tab; Refill: 1  - Yash Jayne Mann ref [id number]    3. Other hyperlipidemia  - restart Crestor  - check lipid panel    4. Essential hypertension  - poorly controlled  - restart Coreg  - CBC WITH AUTOMATED DIFF; Future  - LIPID PANEL; Future  - METABOLIC PANEL, COMPREHENSIVE; Future  - TSH 3RD GENERATION; Future  - URINALYSIS W/ RFLX MICROSCOPIC; Future    5.  Glucose-6-phosphate dehydrogenase (G6PD) deficiency anemia ([facility]  - stable    6. Sickle cell trait (HCC)  - check CBC    7. Hepatitis C virus infection without hepatic coma, unspecified chronicity  - CBC WITH AUTOMATED DIFF; Future  - HIV 1/2 AG/AB, 4TH GENERATION,W RFLX CONFIRM; Future  - METABOLIC PANEL, COMPREHENSIVE; Future  - HCV RNA BY MARIA ESTHER QL,RFLX TO QT; Future  - PROTHROMBIN TIME + INR; Future  - REFERRAL TO GASTROENTEROLOGY    8. Poor compliance    9. Mass of eye, right  - REFERRAL TO OPHTHALMOLOGY    10. Chronic obstructive pulmonary disease, unspecified COPD type ([facility]  - continue albuterol prn  - REFERRAL TO PULMONARY DISEASE    11. Cigarette nicotine dependence without complication  - pre-contemplative stage with respect to tobacco use. I advised patient to quit, and offered support. Approximately 2 minutes spent regarding risks of smoking, benefits of smoking cessation and treatment options. 12. Bipolar affective disorder, remission status unspecified (HCC)  - QUEtiapine (SEROQUEL) 100 mg tablet; Take 1 Tab by mouth nightly. Dispense: 90 Tab; Refill: 1  - provided a list of psych facilities    13. Recurrent depression (HCC)  - QUEtiapine (SEROQUEL) 100 mg tablet; Take 1 Tab by mouth nightly. Dispense: 90 Tab; Refill: 1      Follow-up Disposition:  Return in about 4 weeks (around 8/17/2018) for rov. I have discussed the diagnosis with the patient and the intended plan as seen in the above orders. The patient has received an after-visit summary and questions were answered concerning future plans. I have discussed medication side effects and warnings with the patient as well. I have reviewed the plan of care with the patient, accepted their input and they are in agreement with the treatment goals.        Marilu Roman MD  July 20, 2018

## 2018-07-23 LAB
HIV 1+2 AB+HIV1 P24 AG SERPL QL IA: NONREACTIVE
HIV12 RESULT COMMENT, HHIVC: NORMAL

## 2018-07-24 ENCOUNTER — TELEPHONE (OUTPATIENT)
Dept: CARDIOLOGY CLINIC | Age: 58
End: 2018-07-24

## 2018-07-27 LAB
HCV RNA SERPL NAA+PROBE-LOG IU: 7.11 HCV LOG 10IU/ML
HCV RNA SERPL PROBE AMP-ACNC: ABNORMAL HCVIU/ML
HCV RNA SERPL QL NAA+PROBE: POSITIVE

## 2018-07-30 ENCOUNTER — TELEPHONE (OUTPATIENT)
Dept: CARDIOLOGY CLINIC | Age: 58
End: 2018-07-30

## 2018-08-20 ENCOUNTER — DOCUMENTATION ONLY (OUTPATIENT)
Dept: FAMILY MEDICINE CLINIC | Age: 58
End: 2018-08-20

## 2018-08-20 NOTE — LETTER
8/20/2018 498  18[facility] [id number] Dear Renzo Magaly Hedrick.,  
 
We had an appointment reserved for you on 8/17/18 and were concerned when you did not show or call within 24 hours to cancel the appointment. Our policy is to call patients two days prior to their appointment to remind them of the date and time. We perform these calls as a courtesy to our patients and to allow us the opportunity to rebook the time slot should the appointment not be necessary. Recognizing that everyones time is valuable and that appointment time is limited, we ask that you provide 24 hours notice if you are unable to keep your appointment. Please call us at your earliest convenience to reschedule your appointment as your provider felt it was important to see you. Thank you for your anticipated cooperation. [address] [place] [id number] [phone]

## 2018-12-18 ENCOUNTER — OFFICE VISIT (OUTPATIENT)
Dept: FAMILY MEDICINE CLINIC | Age: 58
End: 2018-12-18

## 2018-12-18 ENCOUNTER — HOSPITAL ENCOUNTER (OUTPATIENT)
Dept: GENERAL RADIOLOGY | Age: 58
Discharge: HOME | End: 2018-12-18
Attending: INTERNAL MEDICINE
Payer: MEDICARE

## 2018-12-18 VITALS
DIASTOLIC BLOOD PRESSURE: 84 MMHG | TEMPERATURE: 98.1 F | HEART RATE: 67 BPM | BODY MASS INDEX: 22.91 KG/M2 | RESPIRATION RATE: 18 BRPM | SYSTOLIC BLOOD PRESSURE: 148 MMHG | HEIGHT: 67 IN | WEIGHT: 146 LBS | OXYGEN SATURATION: 99 %

## 2018-12-18 DIAGNOSIS — R06.09 DOE (DYSPNEA ON EXERTION): ICD-10-CM

## 2018-12-18 DIAGNOSIS — F17.210 CIGARETTE SMOKER: ICD-10-CM

## 2018-12-18 DIAGNOSIS — B19.20 HEPATITIS C VIRUS INFECTION WITHOUT HEPATIC COMA, UNSPECIFIED CHRONICITY: ICD-10-CM

## 2018-12-18 DIAGNOSIS — F17.210 CIGARETTE NICOTINE DEPENDENCE WITHOUT COMPLICATION: ICD-10-CM

## 2018-12-18 DIAGNOSIS — J41.0 SIMPLE CHRONIC BRONCHITIS (HCC): ICD-10-CM

## 2018-12-18 DIAGNOSIS — E78.49 OTHER HYPERLIPIDEMIA: ICD-10-CM

## 2018-12-18 DIAGNOSIS — R05.9 COUGH: Primary | ICD-10-CM

## 2018-12-18 DIAGNOSIS — J43.2 CENTRILOBULAR EMPHYSEMA (HCC): ICD-10-CM

## 2018-12-18 DIAGNOSIS — I10 ESSENTIAL HYPERTENSION: ICD-10-CM

## 2018-12-18 DIAGNOSIS — R05.9 COUGH: ICD-10-CM

## 2018-12-18 DIAGNOSIS — I25.810 CORONARY ARTERY DISEASE INVOLVING AUTOLOGOUS ARTERY CORONARY BYPASS GRAFT WITHOUT ANGINA PECTORIS: ICD-10-CM

## 2018-12-18 PROBLEM — Z86.711 HISTORY OF PULMONARY EMBOLISM: Status: ACTIVE | Noted: 2018-12-18

## 2018-12-18 LAB
S PYO AG THROAT QL: NEGATIVE
VALID INTERNAL CONTROL?: YES

## 2018-12-18 PROCEDURE — 71046 X-RAY EXAM CHEST 2 VIEWS: CPT

## 2018-12-18 RX ORDER — BUDESONIDE AND FORMOTEROL FUMARATE DIHYDRATE 160; 4.5 UG/1; UG/1
2 AEROSOL RESPIRATORY (INHALATION) 2 TIMES DAILY
Qty: 1 INHALER | Refills: 0 | Status: SHIPPED | OUTPATIENT
Start: 2018-12-18 | End: 2019-04-23

## 2018-12-18 NOTE — PROGRESS NOTES
Randy West. is a 62 y.o. male  Chief Complaint   Patient presents with    Cough     1. Have you been to the ER, urgent care clinic since your last visit? Hospitalized since your last visit? No    2. Have you seen or consulted any other health care providers outside of the [address] [place] [address] since your last visit? Include any pap smears or colon screening.  No

## 2018-12-18 NOTE — PROGRESS NOTES
History of Present Illness  Yanna Main is a 62 y.o. male who presents today for management of    Chief Complaint   Patient presents with    Cough     Cough  Patient complains of nonproductive cough. Symptoms began 2 months ago. The cough is non-productive, with wheezing and is aggravated by nothing. Associated symptoms include:wheezing. Patient does not have new pets. Patient does not have a history of asthma. Patient does not have a history of environmental allergens. Patient does not have recent travel. Patient does have a history of smoking. Patient  does not have previous Chest X-ray. Patient has not had a PPD done. Patient has history of COPD. No increase shortness of breath. He can walk 2 block without problems. He ran out of Seculert 4 months ago. He uses albuterol about 1-2 times per week. He has history of PE in 2014.       Problem List  Patient Active Problem List    Diagnosis Date Noted    History of pulmonary embolism 12/18/2018    Poor compliance 07/20/2018    History of blood clots 07/20/2018    Other hyperlipidemia 07/20/2018    Hepatitis C virus infection without hepatic coma 07/20/2018    Coronary artery disease involving coronary bypass graft of native heart without angina pectoris 07/20/2018    Chronic obstructive pulmonary disease ([facility] 07/20/2018    Recurrent depression ([facility] 07/20/2018    Bipolar affective disorder ([address] 07/20/2018    Cigarette nicotine dependence without complication 24/38/0371    PAD (peripheral artery disease) ([address] 08/25/2017    History of prediabetes 12/14/2016    Essential hypertension 12/14/2016    Moderate cigarette smoker (10-19 per day) 12/14/2016    Panlobular emphysema ([address] 12/14/2016    Glucose-6-phosphate dehydrogenase (G6PD) deficiency anemia ([address] 12/14/2016    Coronary artery disease involving autologous artery coronary bypass graft without angina pectoris 01/27/2016    Hyperlipidemia 01/27/2016    S/P CABG x 6 01/27/2016    Sickle cell trait ([facility] 01/29/2015       Past Medical History  Past Medical History:   Diagnosis Date    [facility] dependence [facility]     BPH (benign prostatic hypertrophy) with urinary retention     CAD (coronary artery disease)     2006 X 2    Chronic lung disease     Chronic obstructive pulmonary disease (HCC)     Congestive heart failure (HCC)     Coronary artery disease     Depression     GERD (gastroesophageal reflux disease)     Hypertension     Liver disease     Hepatitis C    Psychotic disorder ([facility]     Thromboembolus ([facility]         Surgical History  Past Surgical History:   Procedure Laterality Date    BYPASS GRAFT OTHR,FEM-FEM      2006    HX CORONARY ARTERY BYPASS GRAFT  2006    x6    HX CORONARY STENT PLACEMENT  2006    HX ORTHOPAEDIC      cervical spine 2013        Current Medications  Current Outpatient Medications   Medication Sig    budesonide-formoterol (SYMBICORT) 160-4.5 mcg/actuation HFAA Take 2 Puffs by inhalation two (2) times a day.  carvedilol (COREG) 6.25 mg tablet Take 1 Tab by mouth two (2) times daily (with meals). Indications: chronic heart failure    rosuvastatin (CRESTOR) 40 mg tablet Take 1 Tab by mouth nightly. Indications: primary prevention of coronary heart disease    clopidogrel (PLAVIX) 75 mg tab Take 1 Tab by mouth daily. Indications: PERIPHERAL ARTERIAL THROMBOEMBOLISM PREVENTION    QUEtiapine (SEROQUEL) 100 mg tablet Take 1 Tab by mouth nightly.  albuterol (PROVENTIL HFA, VENTOLIN HFA, PROAIR HFA) 90 mcg/actuation inhaler 1-2 Puffs. No current facility-administered medications for this visit.         Allergies/Drug Reactions  Allergies   Allergen Reactions    Matthias Anaphylaxis    Aspirin Other (comments) and Unknown (comments)     Interaction with anemia with long term use    Sulfa (Sulfonamide Antibiotics) Hives        Family History  Family History   Problem Relation Age of Onset    Heart Disease Mother     Heart Disease Father     Hypertension Father     No Known Problems Sister     No Known Problems Sister     No Known Problems Sister     No Known Problems Sister     No Known Problems Sister         Social History  Social History     Socioeconomic History    Marital status: LEGALLY      Spouse name: Not on file    Number of children: Not on file    Years of education: Not on file    Highest education level: Not on file   Social Needs    Financial resource strain: Not on file    Food insecurity - worry: Not on file    Food insecurity - inability: Not on file   [place] needs - medical: Not on file   [place] needs - non-medical: Not on file   Occupational History    Not on file   Tobacco Use    Smoking status: Current Every Day Smoker     Packs/day: 0.50     Years: 35.00     Pack years: 17.50     Types: Cigarettes    Smokeless tobacco: Current User   Substance and Sexual Activity    Alcohol use:  Yes     Alcohol/week: 12.6 oz     Types: 21 Cans of beer per week    Drug use: No     Comment: used cocaine in the past    Sexual activity: Yes     Partners: Female   Other Topics Concern    Not on file   Social History Narrative    Not on file       Review of Systems  Negative except as mentioned in HPI      Physical Exam  Vital signs:   Vitals:    12/18/18 0933   BP: 148/84   Pulse: 67   Resp: 18   Temp: 98.1 °F (36.7 °C)   TempSrc: Oral   SpO2: 99%   Weight: 146 lb (66.2 kg)   Height: 5' 7\" (1.702 m)       General: alert, oriented, not in distress  Eyes: clear conjunctivae, anicteric sclerae, full and equal ROMs  Chest/Lungs: clear breath sounds, no wheezing or crackles  Heart: normal rate, regular rhythm, no murmur  Extremities: no focal deformities, no edema  Neuro: AAOx3, CN's grossly intact  Skin: no visible abnormalities    Laboratory/Tests:  Component      Latest Ref Rng & Units 7/20/2018 7/20/2018 7/20/2018 7/20/2018           9:28 AM  9:28 AM  9:28 AM  9:28 AM   WBC      4.6 - 13.2 K/uL    9.9   RBC      4.70 - 5.50 M/uL    5.26   HGB      13.0 - 16.0 g/dL    15.9   HCT      36.0 - 48.0 %    46.4   MCV      74.0 - 97.0 FL    88.2   MCH      24.0 - 34.0 PG    30.2   MCHC      31.0 - 37.0 g/dL    34.3   RDW      11.6 - 14.5 %    13.7   PLATELET      117 - 973 K/uL    213   MPV      9.2 - 11.8 FL    9.6   NEUTROPHILS      40 - 73 %    65   LYMPHOCYTES      21 - 52 %    28   MONOCYTES      3 - 10 %    7   EOSINOPHILS      0 - 5 %    0   BASOPHILS      0 - 2 %    0   ABS. NEUTROPHILS      1.8 - 8.0 K/UL    6.4   ABS. LYMPHOCYTES      0.9 - 3.6 K/UL    2.8   ABS. MONOCYTES      0.05 - 1.2 K/UL    0.7   ABS. EOSINOPHILS      0.0 - 0.4 K/UL    0.0   ABS. BASOPHILS      0.0 - 0.1 K/UL    0.0   DF          AUTOMATED   Sodium      136 - 145 mmol/L  141     Potassium      3.5 - 5.5 mmol/L  4.2     Chloride      100 - 108 mmol/L  108     CO2      21 - 32 mmol/L  25     Anion gap      3.0 - 18 mmol/L  8     Glucose      74 - 99 mg/dL  94     BUN      7.0 - 18 MG/DL  12     Creatinine      0.6 - 1.3 MG/DL  1.12     BUN/Creatinine ratio      12 - 20    11 (L)     GFR est AA      >60 ml/min/1.73m2  >60     GFR est non-AA      >60 ml/min/1.73m2  >60     Calcium      8.5 - 10.1 MG/DL  8.9     Bilirubin, total      0.2 - 1.0 MG/DL  0.3     ALT (SGPT)      16 - 61 U/L  36     AST      15 - 37 U/L  34     Alk.  phosphatase      45 - 117 U/L  93     Protein, total      6.4 - 8.2 g/dL  8.1     Albumin      3.4 - 5.0 g/dL  4.1     Globulin      2.0 - 4.0 g/dL  4.0     A-G Ratio      0.8 - 1.7    1.0     Cholesterol, total      <200 MG/DL   261 (H)    Triglyceride      <150 MG/DL   120    HDL Cholesterol      40 - 60 MG/DL   60    LDL, calculated      0 - 100 MG/DL   177 (H)    VLDL, calculated      MG/DL   24    CHOL/HDL Ratio      0 - 5.0     4.4    HIV 1/2 Interpretation      NR   NONREACTIVE      HIV 1/2 result comment       SEE NOTE        Component      Latest Ref Rng & Units 7/20/2018 7/20/2018 7/20/2018 9:28 AM  9:28 AM  9:28 AM   Color         YELLOW   Appearance         CLEAR   Specific gravity      1.005 - 1.030     1.017   pH (UA)      5.0 - 8.0     5.0   Protein      NEG mg/dL   100 (A)   Glucose      NEG mg/dL   NEGATIVE   Ketone      NEG mg/dL   TRACE (A)   Bilirubin      NEG     NEGATIVE   Blood      NEG     NEGATIVE   Urobilinogen      0.2 - 1.0 EU/dL   0.2   Nitrites      NEG     NEGATIVE   Leukocyte Esterase      NEG     NEGATIVE   Hepatitis C RNA, QL, MARIA ESTHER      NOTD   POSITIVE (A)     HCV, IU/mL      NOTD HCVIU/mL 89,478,382 (A)     HCV log 10      NOTD HCV log 10IU/Ml 7.11 (A)     TSH      0.36 - 3.74 uIU/mL  0.46        Assessment/Plan:      1. Cough  - AMB POC RAPID STREP A  - XR CHEST PA LAT; Future    2. Simple chronic bronchitis (HCC)  - REFERRAL TO PULMONARY DISEASE  - restart budesonide-formoterol (SYMBICORT) 160-4.5 mcg/actuation HFAA; Take 2 Puffs by inhalation two (2) times a day. Dispense: 1 Inhaler; Refill: 0  - continue PRN albuterol    3. Cigarette smoker  contemplative stage with respect to tobacco use. I advised patient to quit, and offered support. 4. Hepatitis C virus infection without hepatic coma, unspecified chronicity  - prvided the number for [facility] to reschedule appointment    5. Other hyperlipidemia  - poorly controlled due to poor compliance with meds  - restart Crestor    6. Essential hypertension  - fairly controlled    7. Coronary artery disease involving autologous artery coronary bypass graft without angina pectoris  - no angina symptoms  - continue Plavix and coreg and statin      Follow-up Disposition:  Return in about 3 months (around 3/18/2019) for rov. I have discussed the diagnosis with the patient and the intended plan as seen in the above orders. The patient has received an after-visit summary and questions were answered concerning future plans. I have discussed medication side effects and warnings with the patient as well.  I have reviewed the plan of care with the patient, accepted their input and they are in agreement with the treatment goals.        Dimitry Trimble MD  December 18, 2018

## 2018-12-19 ENCOUNTER — TELEPHONE (OUTPATIENT)
Dept: FAMILY MEDICINE CLINIC | Age: 58
End: 2018-12-19

## 2018-12-19 NOTE — TELEPHONE ENCOUNTER
Patient called I stating that his prescription for his symbicort usually costs him about 6 or 7 dollars and when he went to the pharmacy they were asking for 300 dollars so he called his insurance and they told him that the medication needs to be dropped down in tier exception and that once that is down then it should be put into tier 1 or 2 and should not cost 300 dollars and he was wondering if Dr. Haily Winters would be able to do this as soon as possible if she can do it. Patient is asking for a call back tomorrow. Please advise.

## 2018-12-21 NOTE — TELEPHONE ENCOUNTER
It seems like all inhaled steroid-LABA combination is tier 3 (including symbicort, advair, breo). Cedric requires PA. Would it be possible to check with patient's insurance plan?

## 2019-01-02 NOTE — TELEPHONE ENCOUNTER
Pt called in wanting to know if anyone had gotten around to taking care of the issue of his medication being too expensive. He said he did talk with the insurance company and they told him to let the Doctor know to do a tier exception and it should bring the price down. He said if the doctor needs to call and speak with him they can. Please advise.

## 2019-01-03 NOTE — TELEPHONE ENCOUNTER
Patient states that his insurance said that a tier exception form can be completed for his Symbicort. Advised patient to call his insurance where that form may be downloaded. Once received, it will be competed and faxed to [facility].

## 2019-01-31 ENCOUNTER — OFFICE VISIT (OUTPATIENT)
Dept: PULMONOLOGY | Facility: CLINIC | Age: 59
End: 2019-01-31

## 2019-01-31 VITALS
SYSTOLIC BLOOD PRESSURE: 128 MMHG | BODY MASS INDEX: 23.23 KG/M2 | DIASTOLIC BLOOD PRESSURE: 78 MMHG | OXYGEN SATURATION: 98 % | TEMPERATURE: 97.7 F | RESPIRATION RATE: 18 BRPM | HEART RATE: 66 BPM | WEIGHT: 148 LBS | HEIGHT: 67 IN

## 2019-01-31 DIAGNOSIS — R05.3 CHRONIC COUGH: ICD-10-CM

## 2019-01-31 DIAGNOSIS — J45.909 UNCOMPLICATED ASTHMA, UNSPECIFIED ASTHMA SEVERITY, UNSPECIFIED WHETHER PERSISTENT: ICD-10-CM

## 2019-01-31 DIAGNOSIS — R06.02 SOB (SHORTNESS OF BREATH): Primary | ICD-10-CM

## 2019-01-31 RX ORDER — FLUTICASONE PROPIONATE AND SALMETEROL 100; 50 UG/1; UG/1
1 POWDER RESPIRATORY (INHALATION) EVERY 12 HOURS
Qty: 1 INHALER | Refills: 11 | Status: SHIPPED | OUTPATIENT
Start: 2019-01-31

## 2019-01-31 NOTE — PROGRESS NOTES
[facility]  Pulmonary, Critical Care, and Sleep Medicine       Pulmonary Office Progress Notes        Subjective:      57-year-old male here for evaluation of cough    History  Patient started smoking at age 25. During the last 40 years he stopped smoking for about 5 years. He is smoked varying amounts during that time, but likely around 0.5-1 packs/day. He has had a cough for the past 3 months. In general it produces clear sputum if anything. He has had a couple instances of yellowish sputum but no purulent sputum or hemoptysis. The cough is worse at night, especially when lying down. He has a history of postnasal drainage. Some 30 years ago he regularly use Afrin nasal spray to the point that he developed tachyphylaxis. He has regular problems with reflux but only intermittently will take omeprazole. In the morning he will have bouts of coughing that are severe and paroxysmal.  He will cough to the point that he will bring up clear sputum that looks like \"snot. \"  He has some shortness of breath that will make it difficult for him to get up 1 flight of residential stairs. He can walk through a large store without needing to use a cart. He did have Symbicort for a brief period of time and this significantly helped his cough. He is unable to afford the Symbicort since it sounds as though it is a high co-pay or uncovered drug. His cough was completely controlled when he required a course of prednisone for a bout of bronchitis. He is not on an ACE inhibitor, and no longer uses Afrin nasal spray. Review of systems  He has nasal congestion but no purulent or bloody discharge. He has no neurologic complaints. He denies any dysphasia or odynophagia. He has no chest pains. He denies abdominal pain. He has no lower extremity edema. He denies fevers, night sweats or weight loss.   The review of systems was completed in its entirety and is otherwise normal.    Past medical history  Coronary artery disease status post bypass grafting 2006  Tobacco abuse  Alcohol abuse  Hepatitis C antibody positive  Asthma  BPH  Thromboembolus  G6PD deficiency anemia  Hypertension  Dyslipidemia  Depression    Allergies   Allergen Reactions    Matthias Anaphylaxis    Aspirin Other (comments) and Unknown (comments)     Interaction with anemia with long term use    Sulfa (Sulfonamide Antibiotics) Hives     Current Outpatient Medications on File Prior to Visit   Medication Sig Dispense Refill    carvedilol (COREG) 6.25 mg tablet Take 1 Tab by mouth two (2) times daily (with meals). Indications: chronic heart failure 180 Tab 1    rosuvastatin (CRESTOR) 40 mg tablet Take 1 Tab by mouth nightly. Indications: primary prevention of coronary heart disease 90 Tab 1    clopidogrel (PLAVIX) 75 mg tab Take 1 Tab by mouth daily. Indications: PERIPHERAL ARTERIAL THROMBOEMBOLISM PREVENTION 90 Tab 1    QUEtiapine (SEROQUEL) 100 mg tablet Take 1 Tab by mouth nightly. 90 Tab 1    albuterol (PROVENTIL HFA, VENTOLIN HFA, PROAIR HFA) 90 mcg/actuation inhaler 1-2 Puffs.  budesonide-formoterol (SYMBICORT) 160-4.5 mcg/actuation HFAA Take 2 Puffs by inhalation two (2) times a day. 1 Inhaler 0     No current facility-administered medications on file prior to visit. Social history  History of tobacco abuse    Family history  Multiple family members with asthma    Objective:     Exam  He is alert, oriented and in no respiratory distress at rest.  Affect is normal.  Blood pressure 128/78, pulse 66, temperature 97.7 °F (36.5 °C), temperature source Oral, resp. rate 18, height 5' 7\" (1.702 m), weight 67.1 kg (148 lb), SpO2 98 %. Sclera are anicteric. The extraocular muscles are intact. Gaze is conjugate.   Oral mucosa is moist.  Neck is supple and is no appreciable lymphadenopathy or jugular venous distention  Lungs are clear to auscultation  Heart has a regular rate and rhythm without appreciable murmur or gallop  Abdomen is soft, nontender  Extremities show no cyanosis, clubbing or edema  No facial rash. No swelling of the digits of the hands or wrists suggesting an inflammatory arthritis    Pulmonary function tests 1/31/19 showed the FEV1 pre-bronchodilators to be mildly reduced to 2.13 L or 75% predicted. There is significant 14% improvement post bronchodilators to 2.42 L or 86% predicted. The FVC post bronchodilators is 3.21 L or 89% predicted. The FEV1/FVC ratio is normal.  The FEF 25-75% is 68% predicted post bronchodilators. The flow volume loop is consistent with mild COPD. Overall, the picture is consistent with mild COPD with significant reversibility likely reflecting his history of childhood asthma.           Assessment  Chronic cough  Asthma  Gastroesophageal reflux disease  Postnasal drip  Coronary artery disease  Tobacco abuse  Venous thromboembolus    Plan:  Continue LABA/ICS combination  Continue albuterol  Omeprazole 20 mg at dinner initially and twice daily for potential control of paroxysmal coughing once daily dosing is insufficient  Consider use of Flonase  1 month return to clinic

## 2019-01-31 NOTE — PROGRESS NOTES
Chief Complaint   Patient presents with    Cough     patient is here today complaining of a chronic cough and bronchitis. he states he has been told he has COPD. he states if his cough is productive it is usually white and never bloody and it is worse at night. he does note some shortness of breath with activity. 1. Have you been to the ER, urgent care clinic since your last visit? Hospitalized since your last visit? No    2. Have you seen or consulted any other health care providers outside of the [address] [place] [address] since your last visit? Include any pap smears or colon screening.  No

## 2019-04-23 ENCOUNTER — HOSPITAL ENCOUNTER (OUTPATIENT)
Dept: LAB | Age: 59
Discharge: HOME | End: 2019-04-23
Payer: MEDICARE

## 2019-04-23 ENCOUNTER — OFFICE VISIT (OUTPATIENT)
Dept: FAMILY MEDICINE CLINIC | Age: 59
End: 2019-04-23

## 2019-04-23 VITALS
HEIGHT: 67 IN | DIASTOLIC BLOOD PRESSURE: 82 MMHG | HEART RATE: 66 BPM | RESPIRATION RATE: 20 BRPM | OXYGEN SATURATION: 98 % | BODY MASS INDEX: 23.17 KG/M2 | TEMPERATURE: 97.3 F | SYSTOLIC BLOOD PRESSURE: 151 MMHG | WEIGHT: 147.6 LBS

## 2019-04-23 DIAGNOSIS — R35.0 BENIGN PROSTATIC HYPERPLASIA WITH URINARY FREQUENCY: ICD-10-CM

## 2019-04-23 DIAGNOSIS — J43.1 PANLOBULAR EMPHYSEMA (HCC): ICD-10-CM

## 2019-04-23 DIAGNOSIS — I73.9 PAD (PERIPHERAL ARTERY DISEASE) (HCC): ICD-10-CM

## 2019-04-23 DIAGNOSIS — I10 ESSENTIAL HYPERTENSION: ICD-10-CM

## 2019-04-23 DIAGNOSIS — R39.9 LOWER URINARY TRACT SYMPTOMS (LUTS): ICD-10-CM

## 2019-04-23 DIAGNOSIS — N40.1 BENIGN PROSTATIC HYPERPLASIA WITH URINARY FREQUENCY: ICD-10-CM

## 2019-04-23 DIAGNOSIS — B19.20 HEPATITIS C VIRUS INFECTION WITHOUT HEPATIC COMA, UNSPECIFIED CHRONICITY: ICD-10-CM

## 2019-04-23 DIAGNOSIS — Z01.818 PRE-OP EVALUATION: ICD-10-CM

## 2019-04-23 DIAGNOSIS — R53.82 CHRONIC FATIGUE, UNSPECIFIED: ICD-10-CM

## 2019-04-23 DIAGNOSIS — E78.49 OTHER HYPERLIPIDEMIA: ICD-10-CM

## 2019-04-23 DIAGNOSIS — I25.810 CORONARY ARTERY DISEASE INVOLVING AUTOLOGOUS ARTERY CORONARY BYPASS GRAFT WITHOUT ANGINA PECTORIS: ICD-10-CM

## 2019-04-23 DIAGNOSIS — I25.810 CORONARY ARTERY DISEASE INVOLVING CORONARY BYPASS GRAFT OF NATIVE HEART WITHOUT ANGINA PECTORIS: Primary | ICD-10-CM

## 2019-04-23 LAB
ALBUMIN SERPL-MCNC: 4 G/DL (ref 3.4–5)
ALBUMIN/GLOB SERPL: 1.1 {RATIO} (ref 0.8–1.7)
ALP SERPL-CCNC: 89 U/L (ref 45–117)
ALT SERPL-CCNC: 43 U/L (ref 16–61)
ANION GAP SERPL CALC-SCNC: 6 MMOL/L (ref 3–18)
AST SERPL-CCNC: 42 U/L (ref 15–37)
BILIRUB SERPL-MCNC: 0.4 MG/DL (ref 0.2–1)
BUN SERPL-MCNC: 13 MG/DL (ref 7–18)
BUN/CREAT SERPL: 12 (ref 12–20)
CALCIUM SERPL-MCNC: 9.2 MG/DL (ref 8.5–10.1)
CHLORIDE SERPL-SCNC: 108 MMOL/L (ref 100–108)
CO2 SERPL-SCNC: 26 MMOL/L (ref 21–32)
CREAT SERPL-MCNC: 1.06 MG/DL (ref 0.6–1.3)
GLOBULIN SER CALC-MCNC: 3.7 G/DL (ref 2–4)
GLUCOSE SERPL-MCNC: 94 MG/DL (ref 74–99)
POTASSIUM SERPL-SCNC: 4.3 MMOL/L (ref 3.5–5.5)
PROT SERPL-MCNC: 7.7 G/DL (ref 6.4–8.2)
PSA SERPL-MCNC: 0.6 NG/ML (ref 0–4)
SODIUM SERPL-SCNC: 140 MMOL/L (ref 136–145)

## 2019-04-23 PROCEDURE — 84403 ASSAY OF TOTAL TESTOSTERONE: CPT

## 2019-04-23 PROCEDURE — 80053 COMPREHEN METABOLIC PANEL: CPT

## 2019-04-23 PROCEDURE — 36415 COLL VENOUS BLD VENIPUNCTURE: CPT

## 2019-04-23 PROCEDURE — 84153 ASSAY OF PSA TOTAL: CPT

## 2019-04-23 RX ORDER — ROSUVASTATIN CALCIUM 40 MG/1
40 TABLET, COATED ORAL
Qty: 90 TAB | Refills: 1 | Status: SHIPPED | OUTPATIENT
Start: 2019-04-23

## 2019-04-23 RX ORDER — CLOPIDOGREL BISULFATE 75 MG/1
75 TABLET ORAL DAILY
Qty: 90 TAB | Refills: 1 | Status: SHIPPED | OUTPATIENT
Start: 2019-04-23 | End: 2021-04-01 | Stop reason: SDUPTHER

## 2019-04-23 RX ORDER — CARVEDILOL 6.25 MG/1
6.25 TABLET ORAL 2 TIMES DAILY WITH MEALS
Qty: 180 TAB | Refills: 1 | Status: SHIPPED | OUTPATIENT
Start: 2019-04-23 | End: 2021-04-01 | Stop reason: SDUPTHER

## 2019-04-23 NOTE — PROGRESS NOTES
History of Present Illness  Reece Homans. is a 62 y.o. male who presents today for management of    Chief Complaint   Patient presents with    Fatigue    Dental Pain       Patient is having a dental procedure at the [facility]. Cardiovascular Review:  The patient has hyperlipidemia, coronary artery disease, history of prior MI in 2006, status post CABG in 2006  and status post coronary artery stenting in 2006, peripheral arterial disease. Diet and Lifestyle: not attempting to follow a low fat, low cholesterol diet, exercises sporadically  Home BP Monitoring: is not measured at home. Pertinent ROS: not taking medications regularly as instructed, no medication side effects noted, no TIA's, no chest pain on exertion, no dyspnea on exertion, no swelling of ankles. Patient has intermittent leg claudification. COPD Review:  The patient is being seen for follow up of COPD. Oxygen: He currently is not on home oxygen therapy. Symptoms: chronic dyspnea: severity = mild: course of sx: stable  able walk 2 blocks. Patient does smoke cigarettes. Patient reports of chronic fatigue for several months, which has been getting worse. Patient also has urinary frequency, dribbling.     Problem List  Patient Active Problem List    Diagnosis Date Noted    History of pulmonary embolism 12/18/2018    Poor compliance 07/20/2018    History of blood clots 07/20/2018    Hepatitis C virus infection without hepatic coma 07/20/2018    Coronary artery disease involving coronary bypass graft of native heart without angina pectoris 07/20/2018    Chronic obstructive pulmonary disease ([address] 07/20/2018    Recurrent depression ([facility] 07/20/2018    Bipolar affective disorder ([address] 07/20/2018    Cigarette nicotine dependence without complication 28/41/5122    PAD (peripheral artery disease) ([facility] 08/25/2017    History of prediabetes 12/14/2016    Essential hypertension 12/14/2016    Moderate cigarette smoker (10-19 per day) 12/14/2016    Panlobular emphysema ([facility] 12/14/2016    Glucose-6-phosphate dehydrogenase (G6PD) deficiency anemia ([facility] 12/14/2016    Coronary artery disease involving autologous artery coronary bypass graft without angina pectoris 01/27/2016    Hyperlipidemia 01/27/2016    S/P CABG x 6 01/27/2016    Sickle cell trait ([facility] 01/29/2015       Past Medical History  Past Medical History:   Diagnosis Date    Alcoholism with alcohol dependence ([facility]     BPH (benign prostatic hypertrophy) with urinary retention     CAD (coronary artery disease)     2006 X 2    Chronic lung disease     Chronic obstructive pulmonary disease (HCC)     Congestive heart failure (HCC)     Coronary artery disease     Depression     GERD (gastroesophageal reflux disease)     Hypertension     Liver disease     Hepatitis C    Psychotic disorder (HCC)     Thromboembolus ([facility]         Surgical History  Past Surgical History:   Procedure Laterality Date    BYPASS GRAFT OTHR,FEM-FEM      2006    HX CORONARY ARTERY BYPASS GRAFT  2006    x6    HX CORONARY STENT PLACEMENT  2006    HX ORTHOPAEDIC      cervical spine 2013        Current Medications  Current Outpatient Medications   Medication Sig    carvedilol (COREG) 6.25 mg tablet Take 1 Tab by mouth two (2) times daily (with meals). Indications: chronic heart failure    rosuvastatin (CRESTOR) 40 mg tablet Take 1 Tab by mouth nightly. Indications: primary prevention of heart attack    clopidogrel (PLAVIX) 75 mg tab Take 1 Tab by mouth daily. Indications: Treatment to Prevent Peripheral Artery Thromboembolism    fluticasone-salmeterol (ADVAIR) 100-50 mcg/dose diskus inhaler Take 1 Puff by inhalation every twelve (12) hours.  QUEtiapine (SEROQUEL) 100 mg tablet Take 1 Tab by mouth nightly.  albuterol (PROVENTIL HFA, VENTOLIN HFA, PROAIR HFA) 90 mcg/actuation inhaler 1-2 Puffs. No current facility-administered medications for this visit. Allergies/Drug Reactions  Allergies   Allergen Reactions    Calvary Anaphylaxis    Aspirin Other (comments) and Unknown (comments)     Interaction with anemia with long term use    Sulfa (Sulfonamide Antibiotics) Hives        Family History  Family History   Problem Relation Age of Onset    Heart Disease Mother     Heart Disease Father     Hypertension Father     No Known Problems Sister     No Known Problems Sister     No Known Problems Sister     No Known Problems Sister     No Known Problems Sister         Social History  Social History     Socioeconomic History    Marital status: LEGALLY      Spouse name: Not on file    Number of children: Not on file    Years of education: Not on file    Highest education level: Not on file   Occupational History    Not on file   Social Needs    Financial resource strain: Not on file    Food insecurity:     Worry: Not on file     Inability: Not on file    Transportation needs:     Medical: Not on file     Non-medical: Not on file   Tobacco Use    Smoking status: Current Every Day Smoker     Packs/day: 0.50     Years: 37.00     Pack years: 18.50     Types: Cigarettes     Start date: 1/31/1978    Smokeless tobacco: Current User   Substance and Sexual Activity    Alcohol use:  Yes     Alcohol/week: 12.6 oz     Types: 21 Cans of beer per week    Drug use: No     Comment: used cocaine in the past    Sexual activity: Yes     Partners: Female   Lifestyle    Physical activity:     Days per week: Not on file     Minutes per session: Not on file    Stress: Not on file   Relationships    Social connections:     Talks on phone: Not on file     Gets together: Not on file     Attends [employer] service: Not on file     Active member of club or organization: Not on file     Attends meetings of clubs or organizations: Not on file     Relationship status: Not on file    Intimate partner violence:     Fear of current or ex partner: Not on file Emotionally abused: Not on file     Physically abused: Not on file     Forced sexual activity: Not on file   Other Topics Concern    Not on file   Social History Narrative    Not on file       Review of Systems  Negative except as mentioned in HPI      Physical Exam  Vital signs:   Vitals:    04/23/19 0747   BP: 151/82   Pulse: 66   Resp: 20   Temp: 97.3 °F (36.3 °C)   TempSrc: Oral   SpO2: 98%   Weight: 147 lb 9.6 oz (67 kg)   Height: 5' 7\" (1.702 m)       General: alert, oriented, not in distress  Eyes: clear conjunctivae, anicteric sclerae, full and equal ROMs  Chest/Lungs: clear breath sounds, no wheezing or crackles  Heart: normal rate, regular rhythm, no murmur  Extremities: no focal deformities, no edema  Neuro: AAOx3, CN's grossly intact  Skin: no visible abnormalities    Laboratory/Tests:  Component      Latest Ref Rng & Units 7/20/2018 7/20/2018 7/20/2018 7/20/2018           9:28 AM  9:28 AM  9:28 AM  9:28 AM   WBC      4.6 - 13.2 K/uL    9.9   RBC      4.70 - 5.50 M/uL    5.26   HGB      13.0 - 16.0 g/dL    15.9   HCT      36.0 - 48.0 %    46.4   MCV      74.0 - 97.0 FL    88.2   MCH      24.0 - 34.0 PG    30.2   MCHC      31.0 - 37.0 g/dL    34.3   RDW      11.6 - 14.5 %    13.7   PLATELET      361 - 150 K/uL    213   MPV      9.2 - 11.8 FL    9.6   NEUTROPHILS      40 - 73 %    65   LYMPHOCYTES      21 - 52 %    28   MONOCYTES      3 - 10 %    7   EOSINOPHILS      0 - 5 %    0   BASOPHILS      0 - 2 %    0   ABS. NEUTROPHILS      1.8 - 8.0 K/UL    6.4   ABS. LYMPHOCYTES      0.9 - 3.6 K/UL    2.8   ABS. MONOCYTES      0.05 - 1.2 K/UL    0.7   ABS. EOSINOPHILS      0.0 - 0.4 K/UL    0.0   ABS.  BASOPHILS      0.0 - 0.1 K/UL    0.0   DF          AUTOMATED   Sodium      136 - 145 mmol/L  141     Potassium      3.5 - 5.5 mmol/L  4.2     Chloride      100 - 108 mmol/L  108     CO2      21 - 32 mmol/L  25     Anion gap      3.0 - 18 mmol/L  8     Glucose      74 - 99 mg/dL  94     BUN      7.0 - 18 MG/DL  12 Creatinine      0.6 - 1.3 MG/DL  1.12     BUN/Creatinine ratio      12 - 20    11 (L)     GFR est AA      >60 ml/min/1.73m2  >60     GFR est non-AA      >60 ml/min/1.73m2  >60     Calcium      8.5 - 10.1 MG/DL  8.9     Bilirubin, total      0.2 - 1.0 MG/DL  0.3     ALT (SGPT)      16 - 61 U/L  36     AST      15 - 37 U/L  34     Alk. phosphatase      45 - 117 U/L  93     Protein, total      6.4 - 8.2 g/dL  8.1     Albumin      3.4 - 5.0 g/dL  4.1     Globulin      2.0 - 4.0 g/dL  4.0     A-G Ratio      0.8 - 1.7    1.0     Cholesterol, total      <200 MG/DL   261 (H)    Triglyceride      <150 MG/DL   120    HDL Cholesterol      40 - 60 MG/DL   60    LDL, calculated      0 - 100 MG/DL   177 (H)    VLDL, calculated      MG/DL   24    CHOL/HDL Ratio      0 - 5.0     4.4    HIV 1/2 Interpretation      NR   NONREACTIVE      HIV 1/2 result comment       SEE NOTE        Component      Latest Ref Rng & Units 7/20/2018 7/20/2018 7/20/2018           9:28 AM  9:28 AM  9:28 AM   Color         YELLOW   Appearance         CLEAR   Specific gravity      1.005 - 1.030     1.017   pH (UA)      5.0 - 8.0     5.0   Protein      NEG mg/dL   100 (A)   Glucose      NEG mg/dL   NEGATIVE   Ketone      NEG mg/dL   TRACE (A)   Bilirubin      NEG     NEGATIVE   Blood      NEG     NEGATIVE   Urobilinogen      0.2 - 1.0 EU/dL   0.2   Nitrites      NEG     NEGATIVE   Leukocyte Esterase      NEG     NEGATIVE   Hepatitis C RNA, QL, MARIA ESTHER      NOTD   POSITIVE (A)     HCV, IU/mL      NOTD HCVIU/mL 33,514,931 (A)     HCV log 10      NOTD HCV log 10IU/Ml 7.11 (A)     TSH      0.36 - 3.74 uIU/mL  0.46        Assessment/Plan:    1. Coronary artery disease involving coronary bypass graft of native heart without angina pectoris  - no angina symptoms  - Patienet is non-compliant with medications  - carvedilol (COREG) 6.25 mg tablet; Take 1 Tab by mouth two (2) times daily (with meals). Indications: chronic heart failure  Dispense: 180 Tab;  Refill: 1  - rosuvastatin (CRESTOR) 40 mg tablet; Take 1 Tab by mouth nightly. Indications: primary prevention of heart attack  Dispense: 90 Tab; Refill: 1  - clopidogrel (PLAVIX) 75 mg tab; Take 1 Tab by mouth daily. Indications: Treatment to Prevent Peripheral Artery Thromboembolism  Dispense: 90 Tab; Refill: 1    2. Essential hypertension  - poorly controlled due to poor compliance with meds    3. PAD (peripheral artery disease) (HCC)  - stable  - restart Plavix    4. Panlobular emphysema ([address]  - Patient has not been able to     5. Other hyperlipidemia  - poorly controlled due to poor compliance with meds  - restart Crestor    6. Hepatitis C virus infection without hepatic coma, unspecified chronicity  - GI follow-up - phone number provided    7. Pre-op evaluation  - ok to stop Plavix 7 days prior to dental procedure  - ok to resume 24 hours after procedure and once bleeding is controlled    8. BPH with LUTS  - check PSA    9. Fatigue  - check CMP and free and total testosterone    Follow-up and Dispositions    · Return in about 3 months (around 7/23/2019) for ROV. I have discussed the diagnosis with the patient and the intended plan as seen in the above orders. The patient has received an after-visit summary and questions were answered concerning future plans. I have discussed medication side effects and warnings with the patient as well. I have reviewed the plan of care with the patient, accepted their input and they are in agreement with the treatment goals.        Marilu Roman MD  April 23, 2019

## 2019-04-23 NOTE — PROGRESS NOTES
1. Have you been to the ER, urgent care clinic since your last visit? Hospitalized since your last visit? No    2. Have you seen or consulted any other health care providers outside of the [address] [place] [address] since your last visit? Include any pap smears or colon screening.  No

## 2019-04-24 LAB
TESTOST FREE SERPL-MCNC: 6.8 PG/ML (ref 7.2–24)
TESTOST SERPL-MCNC: 524 NG/DL (ref 264–916)

## 2019-04-25 NOTE — PROGRESS NOTES
Nurse called patient, two patient identifiers used and confirmed by patient. Spoke to patient, advised of normal labs. Pt verbalized understanding.

## 2021-04-01 ENCOUNTER — OFFICE VISIT (OUTPATIENT)
Dept: FAMILY MEDICINE CLINIC | Age: 61
End: 2021-04-01
Payer: MEDICARE

## 2021-04-01 ENCOUNTER — HOSPITAL ENCOUNTER (OUTPATIENT)
Dept: LAB | Age: 61
Discharge: HOME | End: 2021-04-01
Payer: MEDICARE

## 2021-04-01 VITALS
SYSTOLIC BLOOD PRESSURE: 124 MMHG | HEIGHT: 67 IN | TEMPERATURE: 97.2 F | HEART RATE: 83 BPM | WEIGHT: 138.4 LBS | DIASTOLIC BLOOD PRESSURE: 76 MMHG | OXYGEN SATURATION: 98 % | BODY MASS INDEX: 21.72 KG/M2 | RESPIRATION RATE: 20 BRPM

## 2021-04-01 DIAGNOSIS — R63.1 POLYDIPSIA: ICD-10-CM

## 2021-04-01 DIAGNOSIS — Z00.00 PREVENTATIVE HEALTH CARE: ICD-10-CM

## 2021-04-01 DIAGNOSIS — D57.3 SICKLE CELL TRAIT (HCC): ICD-10-CM

## 2021-04-01 DIAGNOSIS — Z87.898 HISTORY OF PREDIABETES: ICD-10-CM

## 2021-04-01 DIAGNOSIS — F31.9 BIPOLAR AFFECTIVE DISORDER, REMISSION STATUS UNSPECIFIED (HCC): ICD-10-CM

## 2021-04-01 DIAGNOSIS — E78.49 OTHER HYPERLIPIDEMIA: ICD-10-CM

## 2021-04-01 DIAGNOSIS — F33.9 RECURRENT DEPRESSION (HCC): Primary | ICD-10-CM

## 2021-04-01 DIAGNOSIS — R53.82 CHRONIC FATIGUE, UNSPECIFIED: ICD-10-CM

## 2021-04-01 DIAGNOSIS — I25.810 CORONARY ARTERY DISEASE INVOLVING AUTOLOGOUS ARTERY CORONARY BYPASS GRAFT WITHOUT ANGINA PECTORIS: ICD-10-CM

## 2021-04-01 DIAGNOSIS — I10 ESSENTIAL HYPERTENSION: ICD-10-CM

## 2021-04-01 LAB
ALBUMIN SERPL-MCNC: 4 G/DL (ref 3.4–5)
ALBUMIN/GLOB SERPL: 0.9 {RATIO} (ref 0.8–1.7)
ALP SERPL-CCNC: 103 U/L (ref 45–117)
ALT SERPL-CCNC: 38 U/L (ref 16–61)
ANION GAP SERPL CALC-SCNC: 7 MMOL/L (ref 3–18)
APPEARANCE UR: CLEAR
AST SERPL-CCNC: 39 U/L (ref 10–38)
BASOPHILS # BLD: 0 K/UL (ref 0–0.1)
BASOPHILS NFR BLD: 0 % (ref 0–2)
BILIRUB SERPL-MCNC: 1 MG/DL (ref 0.2–1)
BILIRUB UR QL: NEGATIVE
BUN SERPL-MCNC: 8 MG/DL (ref 7–18)
BUN/CREAT SERPL: 10 (ref 12–20)
CALCIUM SERPL-MCNC: 8.8 MG/DL (ref 8.5–10.1)
CHLORIDE SERPL-SCNC: 106 MMOL/L (ref 100–111)
CHOLEST SERPL-MCNC: 322 MG/DL
CO2 SERPL-SCNC: 26 MMOL/L (ref 21–32)
COLOR UR: YELLOW
CREAT SERPL-MCNC: 0.82 MG/DL (ref 0.6–1.3)
DIFFERENTIAL METHOD BLD: NORMAL
EOSINOPHIL # BLD: 0.1 K/UL (ref 0–0.4)
EOSINOPHIL NFR BLD: 1 % (ref 0–5)
ERYTHROCYTE [DISTWIDTH] IN BLOOD BY AUTOMATED COUNT: 13.9 % (ref 11.6–14.5)
EST. AVERAGE GLUCOSE BLD GHB EST-MCNC: 103 MG/DL
GLOBULIN SER CALC-MCNC: 4.6 G/DL (ref 2–4)
GLUCOSE SERPL-MCNC: 61 MG/DL (ref 74–99)
GLUCOSE UR STRIP.AUTO-MCNC: NEGATIVE MG/DL
HBA1C MFR BLD: 5.2 % (ref 4.2–5.6)
HCT VFR BLD AUTO: 44 % (ref 36–48)
HDLC SERPL-MCNC: 62 MG/DL (ref 40–60)
HDLC SERPL: 5.2 {RATIO} (ref 0–5)
HGB BLD-MCNC: 15.5 G/DL (ref 13–16)
HGB UR QL STRIP: NEGATIVE
KETONES UR QL STRIP.AUTO: NEGATIVE MG/DL
LDLC SERPL CALC-MCNC: 219.8 MG/DL (ref 0–100)
LEUKOCYTE ESTERASE UR QL STRIP.AUTO: NEGATIVE
LIPID PROFILE,FLP: ABNORMAL
LYMPHOCYTES # BLD: 3.1 K/UL (ref 0.9–3.6)
LYMPHOCYTES NFR BLD: 32 % (ref 21–52)
MCH RBC QN AUTO: 30.8 PG (ref 24–34)
MCHC RBC AUTO-ENTMCNC: 35.2 G/DL (ref 31–37)
MCV RBC AUTO: 87.5 FL (ref 74–97)
MONOCYTES # BLD: 0.8 K/UL (ref 0.05–1.2)
MONOCYTES NFR BLD: 8 % (ref 3–10)
NEUTS SEG # BLD: 5.7 K/UL (ref 1.8–8)
NEUTS SEG NFR BLD: 59 % (ref 40–73)
NITRITE UR QL STRIP.AUTO: NEGATIVE
PH UR STRIP: 5.5 [PH]
PLATELET # BLD AUTO: 245 K/UL (ref 135–420)
PMV BLD AUTO: 10 FL (ref 9.2–11.8)
POTASSIUM SERPL-SCNC: 3.9 MMOL/L (ref 3.5–5.5)
PROT SERPL-MCNC: 8.6 G/DL (ref 6.4–8.2)
PROT UR STRIP-MCNC: NORMAL MG/DL
RBC # BLD AUTO: 5.03 M/UL (ref 4.7–5.5)
SODIUM SERPL-SCNC: 139 MMOL/L (ref 136–145)
SP GR UR REFRACTOMETRY: <1.005 (ref 1–1.04)
TRIGL SERPL-MCNC: 201 MG/DL (ref ?–150)
UROBILINOGEN UR QL STRIP.AUTO: 0.2 EU/DL
VLDLC SERPL CALC-MCNC: 40.2 MG/DL
WBC # BLD AUTO: 9.7 K/UL (ref 4.6–13.2)

## 2021-04-01 PROCEDURE — G9717 DOC PT DX DEP/BP F/U NT REQ: HCPCS | Performed by: NURSE PRACTITIONER

## 2021-04-01 PROCEDURE — G8752 SYS BP LESS 140: HCPCS | Performed by: NURSE PRACTITIONER

## 2021-04-01 PROCEDURE — 80061 LIPID PANEL: CPT

## 2021-04-01 PROCEDURE — G8754 DIAS BP LESS 90: HCPCS | Performed by: NURSE PRACTITIONER

## 2021-04-01 PROCEDURE — 81001 URINALYSIS AUTO W/SCOPE: CPT

## 2021-04-01 PROCEDURE — G8427 DOCREV CUR MEDS BY ELIG CLIN: HCPCS | Performed by: NURSE PRACTITIONER

## 2021-04-01 PROCEDURE — 36415 COLL VENOUS BLD VENIPUNCTURE: CPT

## 2021-04-01 PROCEDURE — 85025 COMPLETE CBC W/AUTO DIFF WBC: CPT

## 2021-04-01 PROCEDURE — 3017F COLORECTAL CA SCREEN DOC REV: CPT | Performed by: NURSE PRACTITIONER

## 2021-04-01 PROCEDURE — 80053 COMPREHEN METABOLIC PANEL: CPT

## 2021-04-01 PROCEDURE — G8420 CALC BMI NORM PARAMETERS: HCPCS | Performed by: NURSE PRACTITIONER

## 2021-04-01 PROCEDURE — 99214 OFFICE O/P EST MOD 30 MIN: CPT | Performed by: NURSE PRACTITIONER

## 2021-04-01 PROCEDURE — 83036 HEMOGLOBIN GLYCOSYLATED A1C: CPT

## 2021-04-01 RX ORDER — CLOPIDOGREL BISULFATE 75 MG/1
75 TABLET ORAL DAILY
Qty: 90 TAB | Refills: 1 | Status: SHIPPED | OUTPATIENT
Start: 2021-04-01

## 2021-04-01 RX ORDER — VENLAFAXINE 50 MG/1
50 TABLET ORAL DAILY
Qty: 30 TAB | Refills: 0 | Status: SHIPPED | OUTPATIENT
Start: 2021-04-01 | End: 2021-04-04 | Stop reason: CLARIF

## 2021-04-01 RX ORDER — CARVEDILOL 6.25 MG/1
6.25 TABLET ORAL 2 TIMES DAILY WITH MEALS
Qty: 180 TAB | Refills: 1 | Status: SHIPPED | OUTPATIENT
Start: 2021-04-01

## 2021-04-01 RX ORDER — QUETIAPINE FUMARATE 100 MG/1
100 TABLET, FILM COATED ORAL
Qty: 90 TAB | Refills: 1 | Status: SHIPPED | OUTPATIENT
Start: 2021-04-01

## 2021-04-01 NOTE — PROGRESS NOTES
HISTORY OF PRESENT ILLNESS  Jill Davis is a 61 y.o. male seen for physical and depression   HPI  Dr. Garret Rob patient, last saw her April 2019. Patient has a history of psychotic disorder, bipolar, recurrent depression, alcoholism, COPD, hypertension, congestive heart failure, coronary artery disease involving autologous artery coronary bypass with graft, hep C, hyperlipidemia, BPH and poor compliance. Patient reports he has been off his medications for depression and bipolar for several months. He says he is very depressed but denies being suicidal.  He reports, \"I know how I am when I get this way if I do not do something now I will just lay in the bed and will not do anything\". Patient is due for routine labs, ordered. Patient says in the past his psychiatrist had him on Wellbutrin and Effexor. Restarted Seroquel 100 mg daily. Patient reported he does not see his psychiatrist anymore, he stopped going to the [facility] in [place]. .  Ordered referral to psychiatrist.    Ordered routine labs. Restarted patient's Plavix, Coreg, Crestor and Seroquel. Patient reports he been off of his Plavix for approximately 2 months. Advised patient it would be good to follow-up with his cardiologist Dr. Ralf Gaxiola, Dr. Rosales Neal with GI related to his liver and alcohol usage and his PCP Dr. Garret Rob. Patient denies fever, chills, chest pain, shortness of breath, abdomen pain or dark tarry stool. Patient denied suicidal thoughts. Patient reports he drinks a 6 pack and a half of beer daily. Patient is a smoker. Patient is due for colon cancer screening. Review of Systems   Constitutional: Negative. HENT: Negative. Eyes: Negative. Respiratory: Negative. Cardiovascular: Negative. Gastrointestinal: Negative. Genitourinary: Negative. Musculoskeletal: Negative. Skin: Negative. Neurological: Negative. Endo/Heme/Allergies: Negative.     Psychiatric/Behavioral: Positive for depression. Negative for suicidal ideas. The patient is not nervous/anxious. Visit Vitals  /76 (BP 1 Location: Right upper arm, BP Patient Position: Sitting, BP Cuff Size: Small adult)   Pulse 83   Temp 97.2 °F (36.2 °C) (Temporal)   Resp 20   Ht 5' 7\" (1.702 m)   Wt 138 lb 6.4 oz (62.8 kg)   SpO2 98%   BMI 21.68 kg/m²       Physical Exam  Vitals signs and nursing note reviewed. Constitutional:       Appearance: Normal appearance. He is normal weight. He is not ill-appearing. HENT:      Head: Normocephalic and atraumatic. Right Ear: Tympanic membrane, ear canal and external ear normal. There is no impacted cerumen. Left Ear: Tympanic membrane, ear canal and external ear normal. There is no impacted cerumen. Nose: Nose normal. No congestion. Mouth/Throat:      Mouth: Mucous membranes are moist.      Pharynx: Oropharynx is clear. No oropharyngeal exudate or posterior oropharyngeal erythema. Eyes:      General:         Right eye: No discharge. Left eye: No discharge. Conjunctiva/sclera: Conjunctivae normal.      Pupils: Pupils are equal, round, and reactive to light. Neck:      Musculoskeletal: Normal range of motion and neck supple. No muscular tenderness. Vascular: No carotid bruit. Cardiovascular:      Rate and Rhythm: Normal rate and regular rhythm. Heart sounds: Normal heart sounds. No murmur. No friction rub. No gallop. Pulmonary:      Effort: Pulmonary effort is normal.      Breath sounds: Normal breath sounds. Abdominal:      General: Abdomen is flat. Bowel sounds are normal.      Palpations: Abdomen is soft. There is no mass. Tenderness: There is no abdominal tenderness. Musculoskeletal: Normal range of motion. Right lower leg: No edema. Left lower leg: No edema. Lymphadenopathy:      Cervical: No cervical adenopathy. Skin:     General: Skin is dry. Coloration: Skin is not jaundiced or pale. Neurological:      Mental Status: He is alert and oriented to person, place, and time. Psychiatric:         Behavior: Behavior normal.         Thought Content:  Thought content normal.         Judgment: Judgment normal.      Comments: Patient has somewhat of a flat affect but is talking and smiling and appears to be interactive related to his care       Past Medical History:   Diagnosis Date    Alcoholism with alcohol dependence ([facility]     BPH (benign prostatic hypertrophy) with urinary retention     CAD (coronary artery disease)     2006 X 2    Chronic lung disease     Chronic obstructive pulmonary disease (HCC)     Congestive heart failure (HCC)     Coronary artery disease     Depression     GERD (gastroesophageal reflux disease)     Hypertension     Liver disease     Hepatitis C    Psychotic disorder ([facility]     Thromboembolus ([facility]      Patient Active Problem List   Diagnosis Code    Coronary artery disease involving autologous artery coronary bypass graft without angina pectoris I25.810    Hyperlipidemia E78.5    S/P CABG x 6 Z95.1    History of prediabetes Z87.898    Essential hypertension I10    Moderate cigarette smoker (10-19 per day) F17.210    Panlobular emphysema ([facility] J43.1    Sickle cell trait ([facility]) D57.3    Glucose-6-phosphate dehydrogenase (G6PD) deficiency anemia ([facility]) D55.0    PAD (peripheral artery disease) ([facility]) I73.9    Poor compliance Z91.19    History of blood clots Z86.718    Hepatitis C virus infection without hepatic coma B19.20    Coronary artery disease involving coronary bypass graft of native heart without angina pectoris I25.810    Chronic obstructive pulmonary disease ([facility]) J44.9    Recurrent depression ([facility]) F33.9    Bipolar affective disorder ([facility] F31.9    Cigarette nicotine dependence without complication Z94.525    History of pulmonary embolism Z86.711     Current Outpatient Medications on File Prior to Visit   Medication Sig Dispense Refill    rosuvastatin (CRESTOR) 40 mg tablet Take 1 Tab by mouth nightly. Indications: primary prevention of heart attack 90 Tab 1    fluticasone-salmeterol (ADVAIR) 100-50 mcg/dose diskus inhaler Take 1 Puff by inhalation every twelve (12) hours. 1 Inhaler 11    albuterol (PROVENTIL HFA, VENTOLIN HFA, PROAIR HFA) 90 mcg/actuation inhaler 1-2 Puffs. No current facility-administered medications on file prior to visit. ASSESSMENT and PLAN    ICD-10-CM ICD-9-CM    1. Recurrent depression ([facility])  F33.9 296.30 QUEtiapine (SEROquel) 100 mg tablet      REFERRAL TO PSYCHIATRY      DISCONTINUED: venlafaxine (EFFEXOR) 50 mg tablet   2. Essential hypertension  J47 106.1 METABOLIC PANEL, COMPREHENSIVE      URINALYSIS W/ RFLX MICROSCOPIC   3. History of prediabetes  Z87.898 V12.29 URINALYSIS W/ RFLX MICROSCOPIC      HEMOGLOBIN A1C WITH EAG   4. Sickle cell trait ([facility])  D57.3 282.5 CBC WITH AUTOMATED DIFF   5. Other hyperlipidemia  E78.49 272.4 LIPID PANEL   6. Preventative health care  Z00.00 V70.0    7. Coronary artery disease involving autologous artery coronary bypass graft without angina pectoris  I25.810 414.04 clopidogreL (PLAVIX) 75 mg tab      carvediloL (COREG) 6.25 mg tablet   8. Bipolar affective disorder, remission status unspecified ([facility])  F31.9 296.80 QUEtiapine (SEROquel) 100 mg tablet      REFERRAL TO PSYCHIATRY   9. Chronic fatigue, unspecified  R53.82 780.79 HEMOGLOBIN A1C WITH EAG   10. Polydipsia  R63.1 783.5 HEMOGLOBIN A1C WITH EAG     Follow-up and Dispositions    · Return in about 2 weeks (around 4/15/2021). 50% of 30 minutes spent on counseling and managing care. Advised patient is a priority to get him back with a psychiatrist to manage his mental health meds. In addition follow-up with cardiology, GI and his PCP.   Advised patient if his symptoms related to depression worsen and he were to become suicidal to respond to emergency room and be assessed by the community services  on call.

## 2021-04-01 NOTE — PROGRESS NOTES
Room #  12    Chief Complaint:    Complete Physical  depression   medication refill  HPI:    Paula Kelly. is a 61 y.o. male who presents today for c/o Complete Physical,depression and medication refill      1. Have you been to the ER, urgent care clinic since your last visit? Hospitalized since your last visit? NO When:    2. Have you seen or consulted any other health care providers outside of the [address] [place] [address] since your last visit? Include any pap smears or colon screening. NO  When :  Reason:    Last  Checked na  Last UDS Checked na  Last Pain contract signed: na    Consent:  He and/or health care decision maker is aware that that he may receive a bill for this telephone service, depending on his insurance coverage, and has provided verbal consent to proceed: Yes      Health Maintenance reviewed Yes    Health Maintenance Due   Topic Date Due    COVID-19 Vaccine (1) Never done    Shingrix Vaccine Age 50> (1 of 2) Never done    Colorectal Cancer Screening Combo  12/14/2017    Medicare Yearly Exam  Never done    Lipid Screen  07/20/2019     Depression Screening:  3 most recent PHQ Screens 4/1/2021   Little interest or pleasure in doing things Not at all   Feeling down, depressed, irritable, or hopeless Not at all   Total Score PHQ 2 0     Learning Assessment:  Learning Assessment 7/20/2018   PRIMARY LEARNER Patient   HIGHEST LEVEL OF EDUCATION - PRIMARY LEARNER  SOME COLLEGE   BARRIERS PRIMARY LEARNER NONE   CO-LEARNER CAREGIVER No   PRIMARY LANGUAGE ENGLISH   LEARNER PREFERENCE PRIMARY READING     -     -     -   ANSWERED BY self   RELATIONSHIP SELF     Abuse Screening:  Abuse Screening Questionnaire 4/1/2021   Do you ever feel afraid of your partner? N   Are you in a relationship with someone who physically or mentally threatens you? N   Is it safe for you to go home? Y     Fall Risk  No flowsheet data found.